# Patient Record
Sex: MALE | Race: WHITE | Employment: OTHER | URBAN - METROPOLITAN AREA
[De-identification: names, ages, dates, MRNs, and addresses within clinical notes are randomized per-mention and may not be internally consistent; named-entity substitution may affect disease eponyms.]

---

## 2017-03-06 PROBLEM — L98.9 SKIN LESION: Status: ACTIVE | Noted: 2017-03-06

## 2017-03-06 PROBLEM — E78.5 HLD (HYPERLIPIDEMIA): Status: ACTIVE | Noted: 2017-03-06

## 2017-09-13 PROBLEM — B35.1 ONYCHOMYCOSIS: Status: ACTIVE | Noted: 2017-09-13

## 2018-09-20 PROBLEM — I49.49 PREMATURE BEATS: Status: ACTIVE | Noted: 2018-09-20

## 2018-09-20 PROBLEM — R06.00 DYSPNEA: Status: ACTIVE | Noted: 2018-09-20

## 2018-10-09 PROBLEM — I20.8 EXERTIONAL ANGINA (HCC): Status: ACTIVE | Noted: 2018-10-09

## 2018-12-26 ENCOUNTER — HOSPITAL ENCOUNTER (OUTPATIENT)
Dept: CT IMAGING | Age: 68
Discharge: HOME | End: 2018-12-26
Attending: INTERNAL MEDICINE
Payer: MEDICARE

## 2018-12-26 VITALS — HEIGHT: 72 IN | BODY MASS INDEX: 29.8 KG/M2 | WEIGHT: 220 LBS

## 2018-12-26 DIAGNOSIS — I20.8 EXERTIONAL ANGINA (HCC): ICD-10-CM

## 2018-12-26 LAB — CREAT BLD-MCNC: 1.1 MG/DL (ref 0.8–1.5)

## 2018-12-26 PROCEDURE — 74011000258 HC RX REV CODE- 258: Performed by: INTERNAL MEDICINE

## 2018-12-26 PROCEDURE — 75574 CT ANGIO HRT W/3D IMAGE: CPT

## 2018-12-26 PROCEDURE — 82565 ASSAY OF CREATININE: CPT

## 2018-12-26 PROCEDURE — 74011636320 HC RX REV CODE- 636/320: Performed by: INTERNAL MEDICINE

## 2018-12-26 RX ORDER — SODIUM CHLORIDE 0.9 % (FLUSH) 0.9 %
10 SYRINGE (ML) INJECTION
Status: COMPLETED | OUTPATIENT
Start: 2018-12-26 | End: 2018-12-26

## 2018-12-26 RX ADMIN — IOPAMIDOL 120 ML: 755 INJECTION, SOLUTION INTRAVENOUS at 07:25

## 2018-12-26 RX ADMIN — SODIUM CHLORIDE 100 ML: 900 INJECTION, SOLUTION INTRAVENOUS at 07:25

## 2018-12-26 RX ADMIN — Medication 10 ML: at 07:25

## 2018-12-27 PROBLEM — R91.1 LUNG NODULE: Status: ACTIVE | Noted: 2018-12-27

## 2019-01-03 PROBLEM — R07.89 CHEST DISCOMFORT: Status: ACTIVE | Noted: 2019-01-03

## 2019-01-15 PROBLEM — I20.8 EXERTIONAL ANGINA (HCC): Status: RESOLVED | Noted: 2018-10-09 | Resolved: 2019-01-15

## 2019-04-19 ENCOUNTER — HOSPITAL ENCOUNTER (OUTPATIENT)
Dept: CT IMAGING | Age: 69
Discharge: HOME | End: 2019-04-19
Attending: INTERNAL MEDICINE
Payer: MEDICARE

## 2019-04-19 DIAGNOSIS — R91.1 LUNG NODULE: ICD-10-CM

## 2019-04-19 PROCEDURE — 71250 CT THORAX DX C-: CPT

## 2019-04-21 NOTE — PROGRESS NOTES
Notify patient nodule is stable, repeat CT is recommended in 6-12 mos, Dr Sun Day previously spoke with me and will pursue ongoing follow up of this issue. Note is forwarded to him as information as well.

## 2019-09-23 PROBLEM — E66.3 OVERWEIGHT: Status: ACTIVE | Noted: 2019-09-23

## 2020-02-04 ENCOUNTER — HOSPITAL ENCOUNTER (OUTPATIENT)
Dept: LAB | Age: 70
Discharge: HOME | End: 2020-02-04

## 2020-02-04 PROCEDURE — 88305 TISSUE EXAM BY PATHOLOGIST: CPT

## 2020-07-29 NOTE — PROGRESS NOTES
Please let him know that we have been reminded that he is a little overdue for repeat CT of the chest to follow the lung nodule that we have been watching. If he is willing please schedule CT of the chest without contrast for pulmonary nodule follow-up.

## 2020-08-08 ENCOUNTER — HOSPITAL ENCOUNTER (OUTPATIENT)
Dept: CT IMAGING | Age: 70
Discharge: HOME | End: 2020-08-08
Attending: INTERNAL MEDICINE
Payer: MEDICARE

## 2020-08-08 DIAGNOSIS — R91.1 PULMONARY NODULE: ICD-10-CM

## 2020-08-08 PROCEDURE — 71250 CT THORAX DX C-: CPT

## 2020-08-10 NOTE — PROGRESS NOTES
The lung nodule remains stable. The recommendation is to repeat this again in 1 year. If it remains stable at that point no further follow-up will be needed.

## 2022-03-18 PROBLEM — E78.5 HLD (HYPERLIPIDEMIA): Status: ACTIVE | Noted: 2017-03-06

## 2022-03-18 PROBLEM — R06.00 DYSPNEA: Status: ACTIVE | Noted: 2018-09-20

## 2022-03-19 PROBLEM — R91.1 LUNG NODULE: Status: ACTIVE | Noted: 2018-12-27

## 2022-03-19 PROBLEM — I49.49 PREMATURE BEATS: Status: ACTIVE | Noted: 2018-09-20

## 2022-03-19 PROBLEM — E66.3 OVERWEIGHT: Status: ACTIVE | Noted: 2019-09-23

## 2022-03-19 PROBLEM — R07.89 CHEST DISCOMFORT: Status: ACTIVE | Noted: 2019-01-03

## 2022-03-19 PROBLEM — L98.9 SKIN LESION: Status: ACTIVE | Noted: 2017-03-06

## 2022-03-20 PROBLEM — B35.1 ONYCHOMYCOSIS: Status: ACTIVE | Noted: 2017-09-13

## 2022-07-25 RX ORDER — ROSUVASTATIN CALCIUM 10 MG/1
TABLET, COATED ORAL
Qty: 90 TABLET | Refills: 3 | Status: SHIPPED | OUTPATIENT
Start: 2022-07-25

## 2022-08-23 LAB — PROSTATE SPECIFIC ANTIGEN: 3.9 NG/ML

## 2022-10-26 ENCOUNTER — OFFICE VISIT (OUTPATIENT)
Dept: INTERNAL MEDICINE CLINIC | Facility: CLINIC | Age: 72
End: 2022-10-26
Payer: MEDICARE

## 2022-10-26 VITALS
BODY MASS INDEX: 31.56 KG/M2 | HEIGHT: 72 IN | WEIGHT: 233 LBS | RESPIRATION RATE: 18 BRPM | HEART RATE: 68 BPM | SYSTOLIC BLOOD PRESSURE: 143 MMHG | DIASTOLIC BLOOD PRESSURE: 72 MMHG | TEMPERATURE: 97.3 F

## 2022-10-26 DIAGNOSIS — R53.82 CHRONIC FATIGUE, UNSPECIFIED: ICD-10-CM

## 2022-10-26 DIAGNOSIS — B35.1 ONYCHOMYCOSIS: ICD-10-CM

## 2022-10-26 DIAGNOSIS — Z00.00 MEDICARE ANNUAL WELLNESS VISIT, SUBSEQUENT: ICD-10-CM

## 2022-10-26 DIAGNOSIS — N40.1 BENIGN PROSTATIC HYPERPLASIA WITH LOWER URINARY TRACT SYMPTOMS, SYMPTOM DETAILS UNSPECIFIED: Primary | ICD-10-CM

## 2022-10-26 DIAGNOSIS — K58.9 IRRITABLE BOWEL SYNDROME WITHOUT DIARRHEA: ICD-10-CM

## 2022-10-26 DIAGNOSIS — R91.1 SOLITARY PULMONARY NODULE: ICD-10-CM

## 2022-10-26 DIAGNOSIS — E78.49 OTHER HYPERLIPIDEMIA: ICD-10-CM

## 2022-10-26 PROCEDURE — G8417 CALC BMI ABV UP PARAM F/U: HCPCS | Performed by: INTERNAL MEDICINE

## 2022-10-26 PROCEDURE — 1036F TOBACCO NON-USER: CPT | Performed by: INTERNAL MEDICINE

## 2022-10-26 PROCEDURE — G8427 DOCREV CUR MEDS BY ELIG CLIN: HCPCS | Performed by: INTERNAL MEDICINE

## 2022-10-26 PROCEDURE — 99213 OFFICE O/P EST LOW 20 MIN: CPT | Performed by: INTERNAL MEDICINE

## 2022-10-26 PROCEDURE — 3017F COLORECTAL CA SCREEN DOC REV: CPT | Performed by: INTERNAL MEDICINE

## 2022-10-26 PROCEDURE — G8484 FLU IMMUNIZE NO ADMIN: HCPCS | Performed by: INTERNAL MEDICINE

## 2022-10-26 PROCEDURE — G0439 PPPS, SUBSEQ VISIT: HCPCS | Performed by: INTERNAL MEDICINE

## 2022-10-26 PROCEDURE — 1123F ACP DISCUSS/DSCN MKR DOCD: CPT | Performed by: INTERNAL MEDICINE

## 2022-10-26 RX ORDER — SOLIFENACIN SUCCINATE 5 MG/1
5 TABLET, FILM COATED ORAL DAILY
COMMUNITY
End: 2022-10-26 | Stop reason: SDUPTHER

## 2022-10-26 RX ORDER — SOLIFENACIN SUCCINATE 5 MG/1
5 TABLET, FILM COATED ORAL DAILY
Qty: 90 TABLET | Refills: 3 | Status: SHIPPED | OUTPATIENT
Start: 2022-10-26

## 2022-10-26 SDOH — ECONOMIC STABILITY: FOOD INSECURITY: WITHIN THE PAST 12 MONTHS, YOU WORRIED THAT YOUR FOOD WOULD RUN OUT BEFORE YOU GOT MONEY TO BUY MORE.: NEVER TRUE

## 2022-10-26 SDOH — ECONOMIC STABILITY: FOOD INSECURITY: WITHIN THE PAST 12 MONTHS, THE FOOD YOU BOUGHT JUST DIDN'T LAST AND YOU DIDN'T HAVE MONEY TO GET MORE.: NEVER TRUE

## 2022-10-26 ASSESSMENT — LIFESTYLE VARIABLES
HOW MANY STANDARD DRINKS CONTAINING ALCOHOL DO YOU HAVE ON A TYPICAL DAY: 1 OR 2
HOW OFTEN DO YOU HAVE A DRINK CONTAINING ALCOHOL: MONTHLY OR LESS

## 2022-10-26 ASSESSMENT — PATIENT HEALTH QUESTIONNAIRE - PHQ9
1. LITTLE INTEREST OR PLEASURE IN DOING THINGS: 0
SUM OF ALL RESPONSES TO PHQ9 QUESTIONS 1 & 2: 0
2. FEELING DOWN, DEPRESSED OR HOPELESS: 0
SUM OF ALL RESPONSES TO PHQ QUESTIONS 1-9: 0

## 2022-10-26 ASSESSMENT — SOCIAL DETERMINANTS OF HEALTH (SDOH): HOW HARD IS IT FOR YOU TO PAY FOR THE VERY BASICS LIKE FOOD, HOUSING, MEDICAL CARE, AND HEATING?: NOT HARD AT ALL

## 2022-10-26 NOTE — PATIENT INSTRUCTIONS
Personalized Preventive Plan for Carmencita Urena. - 10/26/2022  Medicare offers a range of preventive health benefits. Some of the tests and screenings are paid in full while other may be subject to a deductible, co-insurance, and/or copay. Some of these benefits include a comprehensive review of your medical history including lifestyle, illnesses that may run in your family, and various assessments and screenings as appropriate. After reviewing your medical record and screening and assessments performed today your provider may have ordered immunizations, labs, imaging, and/or referrals for you. A list of these orders (if applicable) as well as your Preventive Care list are included within your After Visit Summary for your review. Other Preventive Recommendations:    A preventive eye exam performed by an eye specialist is recommended every 1-2 years to screen for glaucoma; cataracts, macular degeneration, and other eye disorders. A preventive dental visit is recommended every 6 months. Try to get at least 150 minutes of exercise per week or 10,000 steps per day on a pedometer . Order or download the FREE \"Exercise & Physical Activity: Your Everyday Guide\" from [employer]. Call 4-249.560.3858 or search [employer] online. You need 9280-4627 mg of calcium and 3038-3435 IU of vitamin D per day. It is possible to meet your calcium requirement with diet alone, but a vitamin D supplement is usually necessary to meet this goal.  When exposed to the sun, use a sunscreen that protects against both UVA and UVB radiation with an SPF of 30 or greater. Reapply every 2 to 3 hours or after sweating, drying off with a towel, or swimming. Always wear a seat belt when traveling in a car. Always wear a helmet when riding a bicycle or motorcycle.
Detail Level: Detailed

## 2022-10-26 NOTE — PROGRESS NOTES
Packs/day: 0.50     Types: Cigarettes     Quit date: 1973     Years since quittin.8    Smokeless tobacco: Former   Substance and Sexual Activity    Alcohol use: Yes    Drug use: No     Social Determinants of Health     Financial Resource Strain: Low Risk     Difficulty of Paying Living Expenses: Not hard at all   Food Insecurity: No Food Insecurity    Worried About Running Out of Food in the Last Year: Never true    Ran Out of Food in the Last Year: Never true   Physical Activity: Sufficiently Active    Days of Exercise per Week: 7 days    Minutes of Exercise per Session: 40 min     Past Surgical History:   Procedure Laterality Date    ORTHOPEDIC SURGERY      LT ANKLE SURGERY    OTHER SURGICAL HISTORY Left 10/1996    ankle     OTHER SURGICAL HISTORY  14    biopsy-bladder/prostate    VASECTOMY  [id number]     Current Outpatient Medications   Medication Sig Dispense Refill    solifenacin (VESICARE) 5 MG tablet Take 1 tablet by mouth daily 90 tablet 3    rosuvastatin (CRESTOR) 10 MG tablet TAKE 1 TABLET BY MOUTH EVERY DAY AT NIGHT 90 tablet 3    alfuzosin (UROXATRAL) 10 MG extended release tablet TAKE 1 TABLET BY MOUTH EVERY DAY      dicyclomine (BENTYL) 10 MG capsule Take 10 mg by mouth 3 times daily      finasteride (PROSCAR) 5 MG tablet Take 5 mg by mouth daily      nitroGLYCERIN (NITROSTAT) 0.4 MG SL tablet Place 0.4 mg under the tongue every 5 minutes as needed       No current facility-administered medications for this visit.      Health Maintenance   Topic Date Due    Hepatitis C screen  Never done    AAA screen  Never done    COVID-19 Vaccine (4 - Booster for Pfizer series) 2021    Depression Screen  2022    Flu vaccine (1) 2022    Lipids  10/22/2022    Annual Wellness Visit (AWV)  10/27/2023    Colorectal Cancer Screen  2025    DTaP/Tdap/Td vaccine (5 - Td or Tdap) 10/21/2030    Shingles vaccine  Completed    Pneumococcal 65+ years Vaccine  Completed    Hepatitis A vaccine  Aged Out    Hib vaccine  Aged Out    Meningococcal (ACWY) vaccine  Aged Out     Family History   Problem Relation Age of Onset    Diabetes Father     Stroke Father     Gall Bladder Disease Father     Cancer Mother         in lymph nodes/ breast             Review of Systems  Review of Systems    BP (!) 143/72 (Site: Left Upper Arm, Position: Sitting, Cuff Size: Large Adult)   Pulse 68   Temp 97.3 °F (36.3 °C) (Temporal)   Resp 18   Ht 6' (1.829 m)   Wt 233 lb (105.7 kg)   BMI 31.60 kg/m²       Physical Exam    Physical Exam  Constitutional:       General: He is not in acute distress. Appearance: Normal appearance. He is not ill-appearing. HENT:      Head: Normocephalic and atraumatic. Right Ear: Tympanic membrane and ear canal normal.      Left Ear: Tympanic membrane and ear canal normal.      Nose: Nose normal.      Mouth/Throat:      Mouth: Mucous membranes are dry. Pharynx: Oropharynx is clear. Eyes:      Extraocular Movements: Extraocular movements intact. Conjunctiva/sclera: Conjunctivae normal.      Pupils: Pupils are equal, round, and reactive to light. Cardiovascular:      Rate and Rhythm: Normal rate and regular rhythm. Pulses: Normal pulses. Heart sounds: Normal heart sounds. Pulmonary:      Effort: Pulmonary effort is normal.      Breath sounds: Normal breath sounds. Abdominal:      General: Abdomen is flat. Bowel sounds are normal. There is no distension. Palpations: Abdomen is soft. There is no mass. Tenderness: There is no abdominal tenderness. There is no rebound. Hernia: No hernia is present. Musculoskeletal:         General: Normal range of motion. Feet:       Comments: Extremely thickened brown great toenail   Skin:     General: Skin is warm. Neurological:      General: No focal deficit present. Mental Status: He is alert and oriented to person, place, and time. Motor: No weakness.    Psychiatric:         Mood and Affect: Mood normal.         Behavior: Behavior normal.         Thought Content: Thought content normal.         Judgment: Judgment normal.       Assessment & Plan    Encounter Diagnoses   Name Primary? Benign prostatic hyperplasia with lower urinary tract symptoms, symptom details unspecified Yes    Other hyperlipidemia     Chronic fatigue, unspecified     Solitary pulmonary nodule     Medicare annual wellness visit, subsequent     Irritable bowel syndrome without diarrhea     Onychomycosis     Comment: left great toenail        Current Outpatient Medications   Medication Sig Dispense Refill    solifenacin (VESICARE) 5 MG tablet Take 1 tablet by mouth daily 90 tablet 3    rosuvastatin (CRESTOR) 10 MG tablet TAKE 1 TABLET BY MOUTH EVERY DAY AT NIGHT 90 tablet 3    alfuzosin (UROXATRAL) 10 MG extended release tablet TAKE 1 TABLET BY MOUTH EVERY DAY      dicyclomine (BENTYL) 10 MG capsule Take 10 mg by mouth 3 times daily      finasteride (PROSCAR) 5 MG tablet Take 5 mg by mouth daily      nitroGLYCERIN (NITROSTAT) 0.4 MG SL tablet Place 0.4 mg under the tongue every 5 minutes as needed       No current facility-administered medications for this visit.        Orders Placed This Encounter   Procedures    Comprehensive Metabolic Panel     Standing Status:   Future     Standing Expiration Date:   10/26/2023    CBC with Auto Differential     Standing Status:   Future     Standing Expiration Date:   10/26/2023    Lipid Panel     Standing Status:   Future     Standing Expiration Date:   10/26/2023    Ambulatory referral to Urology     Referral Priority:   Routine     Referral Type:   Eval and Treat     Referral Reason:   Specialty Services Required     Requested Specialty:   Urology     Number of Visits Requested:   1    Amb External Referral To Podiatry     Referral Priority:   Routine     Referral Type:   Consult for Advice and Opinion     Referral Reason:   Specialty Services Required     Requested Specialty: Podiatry     Number of Visits Requested:   1       Medications Discontinued During This Encounter   Medication Reason    solifenacin (VESICARE) 5 MG tablet REORDER       1. Benign prostatic hyperplasia with lower urinary tract symptoms, symptom details unspecified  He is on numerous medications and feels he needs another opinion regarding his therapy  - Ambulatory referral to Urology    2. Other hyperlipidemia     - Lipid Panel; Future    3. Chronic fatigue, unspecified     - Comprehensive Metabolic Panel; Future  - CBC with Auto Differential; Future    4. Solitary pulmonary nodule       5. Medicare annual wellness visit, subsequent       6. Irritable bowel syndrome without diarrhea  Stable  7. Onychomycosis  Probable need to have the left great toenail excised  - Amb External Referral To Podiatry      No follow-up provider specified. Jennifer Nicole MD  Medicare Annual Wellness Visit    Nancy CastanedaSlava is here for Medicare AWV, Annual Exam (Pt presents to the office today for their annual exam./), and Nail Problem (Need a referral to a podiatrist; where he stumped his left big the new nail is growing under the old nail. He thought  it would fall off but it didnt)    Assessment & Plan   Benign prostatic hyperplasia with lower urinary tract symptoms, symptom details unspecified  Other hyperlipidemia  Chronic fatigue, unspecified  Solitary pulmonary nodule  Medicare annual wellness visit, subsequent    Recommendations for Preventive Services Due: see orders and patient instructions/AVS.  Recommended screening schedule for the next 5-10 years is provided to the patient in written form: see Patient Instructions/AVS.     No follow-ups on file. Subjective   The following acute and/or chronic problems were also addressed today:   Diagnosis Orders   1. Benign prostatic hyperplasia with lower urinary tract symptoms, symptom details unspecified  Ambulatory referral to Urology      2.  Other hyperlipidemia Lipid Panel    Lipid Panel      3. Chronic fatigue, unspecified  Comprehensive Metabolic Panel    CBC with Auto Differential    CBC with Auto Differential    Comprehensive Metabolic Panel      4. Solitary pulmonary nodule        5. Medicare annual wellness visit, subsequent        6. Irritable bowel syndrome without diarrhea        7. Onychomycosis  Amb External Referral To Podiatry    left great toenail            Patient's complete Health Risk Assessment and screening values have been reviewed and are found in Flowsheets. The following problems were reviewed today and where indicated follow up appointments were made and/or referrals ordered. Positive Risk Factor Screenings with Interventions:             General Health and ACP:  General  In general, how would you say your health is?: Very Good  In the past 7 days, have you experienced any of the following: New or Increased Pain, New or Increased Fatigue, Loneliness, Social Isolation, Stress or Anger?: No  Do you get the social and emotional support that you need?: Yes  Do you have a Living Will?: Yes    Advance Directives       Power of  Living Will ACP-Advance Directive ACP-Power of     Not on File Not on File Not on File Not on File          General Health Risk Interventions:  none    Health Habits/Nutrition:  Physical Activity: Sufficiently Active    Days of Exercise per Week: 7 days    Minutes of Exercise per Session: 40 min     Have you lost any weight without trying in the past 3 months?: No  Body mass index: (!) 31.6  Have you seen the dentist within the past year?: Yes  Health Habits/Nutrition Interventions:  none             Objective   Vitals:    10/26/22 1006   BP: (!) 143/72   Site: Left Upper Arm   Position: Sitting   Cuff Size: Large Adult   Pulse: 68   Resp: 18   Temp: 97.3 °F (36.3 °C)   TempSrc: Temporal   Weight: 233 lb (105.7 kg)   Height: 6' (1.829 m)      Body mass index is 31.6 kg/m².       See note       No Known Allergies  Prior to Visit Medications    Medication Sig Taking?  Authorizing Provider   solifenacin (VESICARE) 5 MG tablet Take 5 mg by mouth daily Yes Historical Provider, MD   rosuvastatin (CRESTOR) 10 MG tablet TAKE 1 TABLET BY MOUTH EVERY DAY AT NIGHT Yes Norma Ramirez MD   alfuzosin (UROXATRAL) 10 MG extended release tablet TAKE 1 TABLET BY MOUTH EVERY DAY Yes Ar Automatic Reconciliation   dicyclomine (BENTYL) 10 MG capsule Take 10 mg by mouth 3 times daily Yes Ar Automatic Reconciliation   finasteride (PROSCAR) 5 MG tablet Take 5 mg by mouth daily Yes Ar Automatic Reconciliation   nitroGLYCERIN (NITROSTAT) 0.4 MG SL tablet Place 0.4 mg under the tongue every 5 minutes as needed Yes Ar Automatic Reconciliation       CareTeam (Including outside providers/suppliers regularly involved in providing care):   Patient Care Team:  Norma Ramirez MD as PCP - Fani Montejo MD as PCP - [facility] Empaneled Provider     Reviewed and updated this visit:  Tobacco  Allergies  Med Hx  Surg Hx  Soc Hx  Fam Hx

## 2022-10-27 LAB
ALBUMIN SERPL-MCNC: 3.8 G/DL (ref 3.2–4.6)
ALBUMIN/GLOB SERPL: 1.4 {RATIO} (ref 0.4–1.6)
ALP SERPL-CCNC: 59 U/L (ref 50–136)
ALT SERPL-CCNC: 44 U/L (ref 12–65)
ANION GAP SERPL CALC-SCNC: 4 MMOL/L (ref 2–11)
AST SERPL-CCNC: 14 U/L (ref 15–37)
BASOPHILS # BLD: 0.1 K/UL (ref 0–0.2)
BASOPHILS NFR BLD: 1 % (ref 0–2)
BILIRUB SERPL-MCNC: 0.6 MG/DL (ref 0.2–1.1)
BUN SERPL-MCNC: 24 MG/DL (ref 8–23)
CALCIUM SERPL-MCNC: 10 MG/DL (ref 8.3–10.4)
CHLORIDE SERPL-SCNC: 114 MMOL/L (ref 101–110)
CHOLEST SERPL-MCNC: 116 MG/DL
CO2 SERPL-SCNC: 25 MMOL/L (ref 21–32)
CREAT SERPL-MCNC: 1.2 MG/DL (ref 0.8–1.5)
DIFFERENTIAL METHOD BLD: ABNORMAL
EOSINOPHIL # BLD: 0.2 K/UL (ref 0–0.8)
EOSINOPHIL NFR BLD: 2 % (ref 0.5–7.8)
ERYTHROCYTE [DISTWIDTH] IN BLOOD BY AUTOMATED COUNT: 13 % (ref 11.9–14.6)
GLOBULIN SER CALC-MCNC: 2.8 G/DL (ref 2.8–4.5)
GLUCOSE SERPL-MCNC: 102 MG/DL (ref 65–100)
HCT VFR BLD AUTO: 46.2 % (ref 41.1–50.3)
HDLC SERPL-MCNC: 31 MG/DL (ref 40–60)
HDLC SERPL: 3.7 {RATIO}
HGB BLD-MCNC: 15.1 G/DL (ref 13.6–17.2)
IMM GRANULOCYTES # BLD AUTO: 0 K/UL (ref 0–0.5)
IMM GRANULOCYTES NFR BLD AUTO: 1 % (ref 0–5)
LDLC SERPL CALC-MCNC: 32.4 MG/DL
LYMPHOCYTES # BLD: 1.8 K/UL (ref 0.5–4.6)
LYMPHOCYTES NFR BLD: 21 % (ref 13–44)
MCH RBC QN AUTO: 28.2 PG (ref 26.1–32.9)
MCHC RBC AUTO-ENTMCNC: 32.7 G/DL (ref 31.4–35)
MCV RBC AUTO: 86.4 FL (ref 82–102)
MONOCYTES # BLD: 0.9 K/UL (ref 0.1–1.3)
MONOCYTES NFR BLD: 10 % (ref 4–12)
NEUTS SEG # BLD: 5.5 K/UL (ref 1.7–8.2)
NEUTS SEG NFR BLD: 65 % (ref 43–78)
NRBC # BLD: 0 K/UL (ref 0–0.2)
PLATELET # BLD AUTO: 122 K/UL (ref 150–450)
PMV BLD AUTO: 10.6 FL (ref 9.4–12.3)
POTASSIUM SERPL-SCNC: 3.7 MMOL/L (ref 3.5–5.1)
PROT SERPL-MCNC: 6.6 G/DL (ref 6.3–8.2)
RBC # BLD AUTO: 5.35 M/UL (ref 4.23–5.6)
SODIUM SERPL-SCNC: 143 MMOL/L (ref 133–143)
TRIGL SERPL-MCNC: 263 MG/DL (ref 35–150)
VLDLC SERPL CALC-MCNC: 52.6 MG/DL (ref 6–23)
WBC # BLD AUTO: 8.4 K/UL (ref 4.3–11.1)

## 2022-10-29 DIAGNOSIS — D69.6 THROMBOCYTOPENIA (HCC): Primary | ICD-10-CM

## 2023-01-03 ENCOUNTER — NURSE ONLY (OUTPATIENT)
Dept: INTERNAL MEDICINE CLINIC | Facility: CLINIC | Age: 73
End: 2023-01-03

## 2023-01-03 DIAGNOSIS — D69.6 THROMBOCYTOPENIA (HCC): ICD-10-CM

## 2023-01-03 LAB
BASOPHILS # BLD: 0.1 K/UL (ref 0–0.2)
BASOPHILS NFR BLD: 1 % (ref 0–2)
DIFFERENTIAL METHOD BLD: ABNORMAL
EOSINOPHIL # BLD: 0.2 K/UL (ref 0–0.8)
EOSINOPHIL NFR BLD: 2 % (ref 0.5–7.8)
ERYTHROCYTE [DISTWIDTH] IN BLOOD BY AUTOMATED COUNT: 13.2 % (ref 11.9–14.6)
HCT VFR BLD AUTO: 46.5 % (ref 41.1–50.3)
HGB BLD-MCNC: 15.2 G/DL (ref 13.6–17.2)
IMM GRANULOCYTES # BLD AUTO: 0.1 K/UL (ref 0–0.5)
IMM GRANULOCYTES NFR BLD AUTO: 1 % (ref 0–5)
LYMPHOCYTES # BLD: 2.1 K/UL (ref 0.5–4.6)
LYMPHOCYTES NFR BLD: 25 % (ref 13–44)
MCH RBC QN AUTO: 28.1 PG (ref 26.1–32.9)
MCHC RBC AUTO-ENTMCNC: 32.7 G/DL (ref 31.4–35)
MCV RBC AUTO: 86 FL (ref 82–102)
MONOCYTES # BLD: 0.8 K/UL (ref 0.1–1.3)
MONOCYTES NFR BLD: 10 % (ref 4–12)
NEUTS SEG # BLD: 5.2 K/UL (ref 1.7–8.2)
NEUTS SEG NFR BLD: 61 % (ref 43–78)
NRBC # BLD: 0 K/UL (ref 0–0.2)
PLATELET # BLD AUTO: 114 K/UL (ref 150–450)
PMV BLD AUTO: 10.6 FL (ref 9.4–12.3)
RBC # BLD AUTO: 5.41 M/UL (ref 4.23–5.6)
WBC # BLD AUTO: 8.4 K/UL (ref 4.3–11.1)

## 2023-01-05 ENCOUNTER — TELEPHONE (OUTPATIENT)
Dept: INTERNAL MEDICINE CLINIC | Facility: CLINIC | Age: 73
End: 2023-01-05

## 2023-01-05 DIAGNOSIS — D69.6 THROMBOCYTOPENIA (HCC): Primary | ICD-10-CM

## 2023-01-05 NOTE — TELEPHONE ENCOUNTER
----- Message from Oscar Martinez MD sent at 1/5/2023  1:03 PM EST -----  Please notify patient that their lab results shows the platelet count has continued to decrease and is now 114. I would like to refer him to hematology for evaluation of thrombocytopenia.   If he is agreeable please refer cms

## 2023-02-06 NOTE — PROGRESS NOTES
[facility]: Office Visit New Patient H & P    Reason for visit:  Thrombocytopenia    Overview:  Mr. Joseph Cano is a 57-year-old  male with a medical history of elevated PSA, hyperlipidemia, kidney stones, and hemorrhoids. Surgical history includes vasectomy, left ankle surgery, and prostate biopsy (2014). Family history of cancer above, diabetes, and stroke. He is a former smoker but quit in 1973 and denies drug use. Consumes alcohol socially. On 10/26 he was seen in routine follow up with PCP. Platelets were 232. Denies any bleeding or issues. Repeat CBC on 1/3/23 showed persistent low platelet count at 531. Referral to hematology for evaluation and recommendations. History of Present Illness:  Mr. Abhi Mcclendon was seen in the office initially on 2/7/2023 for evaluation of thrombocytopenia noticed on recent labs ordered by his PCP. In last few months, he has noticed a tendency to bruise easily with subjectively minor trauma. He denies hematemesis, bloody/black stools, nosebleeds/gum bleeds, unexplained fever, drenching night sweats, swollen glands in the body, new/unusual sites of bone pain, chest pain or palpitations. He was evaluated by cardiology about 2 years ago for anginal symptoms and reportedly work-up for coronary artery disease was negative. He has had episodes of hematuria in the past which were deemed related to BPH. He follows with urology at [facility], last visit was in October 2022. Review of Systems:  14 point ROS was negative except as mentioned above. ECOG PERFORMANCE STATUS - 0-Fully active, able to carry on all pre-disease performance without restriction. Pain - /10. None/Minimal pain - not affecting QOL     Fatigue - No flowsheet data found. Distress - No flowsheet data found.          Reviewed and updated this visit by provider:          Current Outpatient Medications   Medication Sig Dispense Refill    solifenacin (VESICARE) 5 MG tablet Take 1 tablet by mouth daily 90 tablet 3    rosuvastatin (CRESTOR) 10 MG tablet TAKE 1 TABLET BY MOUTH EVERY DAY AT NIGHT 90 tablet 3    alfuzosin (UROXATRAL) 10 MG extended release tablet TAKE 1 TABLET BY MOUTH EVERY DAY      dicyclomine (BENTYL) 10 MG capsule Take 10 mg by mouth 3 times daily      finasteride (PROSCAR) 5 MG tablet Take 5 mg by mouth daily      nitroGLYCERIN (NITROSTAT) 0.4 MG SL tablet Place 0.4 mg under the tongue every 5 minutes as needed       No current facility-administered medications for this visit. OBJECTIVE:  There were no vitals taken for this visit. Physical Exam:  Patient alert and oriented x 3, in no acute distress  Integumentary: No Pallor, no icterus  HEENT: Moist mucous membranes, normal oropharynx  Lymph nodes: No cervical or axillary lymphadenopathy is palpable  Cardiovascular:RRR, S1, S2 present, no murmur is audible. Extrasystoles are noted.   Lungs: Clear to auscultation, no rales or wheezing, no accessory muscle use  Abdomen: Soft, and non-tender on palpation, no organomegaly, bowel sounds audible  Extremities: No peripheral edema, no joint swelling  Neurological: patient can follow commands and move all extremities    Labs:  Lab Results   Component Value Date    WBC 8.4 01/03/2023    HGB 15.2 01/03/2023    HCT 46.5 01/03/2023    MCV 86.0 01/03/2023     (L) 01/03/2023       Lab Results   Component Value Date    NEUTROABS 5.3 10/22/2021    LYMPHOPCT 25 01/03/2023    LYMPHSABS 2.1 01/03/2023    MONOPCT 10 01/03/2023    MONOSABS 0.8 01/03/2023    EOSABS 0.2 01/03/2023    BASOPCT 1 01/03/2023       Lab Results   Component Value Date     10/26/2022    K 3.7 10/26/2022     (H) 10/26/2022    CO2 25 10/26/2022    BUN 24 (H) 10/26/2022    CREATININE 1.20 10/26/2022    GLUCOSE 102 (H) 10/26/2022    CALCIUM 10.0 10/26/2022    PROT 6.6 10/26/2022    LABALBU 3.8 10/26/2022    BILITOT 0.6 10/26/2022    ALKPHOS 59 10/26/2022    AST 14 (L) 10/26/2022    ALT 44 10/26/2022    LABGLOM >60 10/26/2022    GFRAA 83 10/22/2021    AGRATIO 2.0 10/22/2021    GLOB 2.8 10/26/2022     1/3/23 [facility] PCP CBC with trend         Imaging:  No results found. Problems: This 67years old gentleman presents for evaluation of mild thrombocytopenia noticed on recent labs. With the exception of some bruisability, is fairly asymptomatic. Cardiac exam today reveals presence of extrasystoles. PLAN:  Reviewed the findings of thrombocytopenia. The differential diagnosis is broad, but we will focus for evaluation and ruling out reversible causes. We will send routine labs (citrate tube), smear review by pathologist, PT/PTT/Fibirinogen, LDH, haptoglobin, SPEP, immunoglobulins, vitamin B12, folate, MMA, hepatitis B, C and HIV testing. Abd US asess liver & spleen  Medications reviewed  Discussed the possibility of there being ITP, the diagnosis of which would require excluding virtually all other causes of thrombocytopenia. There is no current indication for bone marrow biopsy and we will likely proceed with observation at this time. No intervention is needed as long as counts remain over 30,000. He will be referred to cardiology  ROV with labs as scheduled. Jose Pham MD  [address] [place] [address] Hematology and Oncology  [address] [place] [address]  Office : [phone]  Fax : [phone]    Elements of this note have been dictated using speech recognition software. As a result, errors of speech recognition may have occurred.

## 2023-02-07 ENCOUNTER — HOSPITAL ENCOUNTER (OUTPATIENT)
Dept: LAB | Age: 73
Discharge: HOME | End: 2023-02-10
Payer: MEDICARE

## 2023-02-07 ENCOUNTER — OFFICE VISIT (OUTPATIENT)
Dept: ONCOLOGY | Age: 73
End: 2023-02-07
Payer: MEDICARE

## 2023-02-07 VITALS
HEIGHT: 72 IN | DIASTOLIC BLOOD PRESSURE: 87 MMHG | RESPIRATION RATE: 16 BRPM | BODY MASS INDEX: 34.31 KG/M2 | SYSTOLIC BLOOD PRESSURE: 149 MMHG | TEMPERATURE: 97.6 F | HEART RATE: 75 BPM | OXYGEN SATURATION: 98 % | WEIGHT: 253.3 LBS

## 2023-02-07 DIAGNOSIS — Z01.89 ENCOUNTER FOR OTHER SPECIFIED SPECIAL EXAMINATIONS: ICD-10-CM

## 2023-02-07 DIAGNOSIS — D69.6 THROMBOCYTOPENIA (HCC): Primary | ICD-10-CM

## 2023-02-07 DIAGNOSIS — D69.6 THROMBOCYTOPENIA (HCC): ICD-10-CM

## 2023-02-07 DIAGNOSIS — I49.9 CARDIAC ARRHYTHMIA, UNSPECIFIED CARDIAC ARRHYTHMIA TYPE: ICD-10-CM

## 2023-02-07 LAB
ALBUMIN SERPL-MCNC: 3.7 G/DL (ref 3.2–4.6)
ALBUMIN/GLOB SERPL: 1.1 (ref 0.4–1.6)
ALP SERPL-CCNC: 59 U/L (ref 50–136)
ALT SERPL-CCNC: 37 U/L (ref 12–65)
ANION GAP SERPL CALC-SCNC: 4 MMOL/L (ref 2–11)
APTT PPP: 26.4 SEC (ref 24.5–34.2)
AST SERPL-CCNC: 14 U/L (ref 15–37)
BASOPHILS # BLD: 0.1 K/UL (ref 0–0.2)
BASOPHILS NFR BLD: 1 % (ref 0–2)
BILIRUB SERPL-MCNC: 0.6 MG/DL (ref 0.2–1.1)
BUN SERPL-MCNC: 20 MG/DL (ref 8–23)
CALCIUM SERPL-MCNC: 9.7 MG/DL (ref 8.3–10.4)
CHLORIDE SERPL-SCNC: 112 MMOL/L (ref 101–110)
CO2 SERPL-SCNC: 25 MMOL/L (ref 21–32)
CREAT SERPL-MCNC: 1.1 MG/DL (ref 0.8–1.5)
DIFFERENTIAL METHOD BLD: ABNORMAL
EOSINOPHIL # BLD: 0.1 K/UL (ref 0–0.8)
EOSINOPHIL NFR BLD: 1 % (ref 0.5–7.8)
ERYTHROCYTE [DISTWIDTH] IN BLOOD BY AUTOMATED COUNT: 13.2 % (ref 11.9–14.6)
FIBRINOGEN PPP-MCNC: 322 MG/DL (ref 190–501)
FOLATE SERPL-MCNC: 13 NG/ML (ref 3.1–17.5)
GLOBULIN SER CALC-MCNC: 3.3 G/DL (ref 2.8–4.5)
GLUCOSE SERPL-MCNC: 97 MG/DL (ref 65–100)
HAPTOGLOB SERPL-MCNC: 108 MG/DL (ref 30–200)
HAV IGM SER QL: NONREACTIVE
HBV CORE IGM SER QL: NONREACTIVE
HBV SURFACE AG SER QL: NONREACTIVE
HCT VFR BLD AUTO: 45.5 % (ref 41.1–50.3)
HCV AB SER QL: NONREACTIVE
HGB BLD-MCNC: 15.1 G/DL (ref 13.6–17.2)
HIV 1+2 AB+HIV1 P24 AG SERPL QL IA: NONREACTIVE
HIV 1/2 RESULT COMMENT: NORMAL
IMM GRANULOCYTES # BLD AUTO: 0.1 K/UL (ref 0–0.5)
IMM GRANULOCYTES NFR BLD AUTO: 1 % (ref 0–5)
INR PPP: 1
LDH SERPL L TO P-CCNC: 152 U/L (ref 110–210)
LYMPHOCYTES # BLD: 1.7 K/UL (ref 0.5–4.6)
LYMPHOCYTES NFR BLD: 21 % (ref 13–44)
MCH RBC QN AUTO: 27.9 PG (ref 26.1–32.9)
MCHC RBC AUTO-ENTMCNC: 33.2 G/DL (ref 31.4–35)
MCV RBC AUTO: 84.1 FL (ref 82–102)
MONOCYTES # BLD: 0.8 K/UL (ref 0.1–1.3)
MONOCYTES NFR BLD: 10 % (ref 4–12)
NEUTS SEG # BLD: 5.4 K/UL (ref 1.7–8.2)
NEUTS SEG NFR BLD: 67 % (ref 43–78)
NRBC # BLD: 0 K/UL (ref 0–0.2)
PLATELET # BLD AUTO: 106 K/UL (ref 150–450)
PMV BLD AUTO: 9.8 FL (ref 9.4–12.3)
POTASSIUM SERPL-SCNC: 4.2 MMOL/L (ref 3.5–5.1)
PROT SERPL-MCNC: 7 G/DL (ref 6.3–8.2)
PROTHROMBIN TIME: 13.1 SEC (ref 12.6–14.3)
RBC # BLD AUTO: 5.41 M/UL (ref 4.23–5.6)
SODIUM SERPL-SCNC: 141 MMOL/L (ref 133–143)
VIT B12 SERPL-MCNC: 282 PG/ML (ref 193–986)
WBC # BLD AUTO: 8.1 K/UL (ref 4.3–11.1)

## 2023-02-07 PROCEDURE — 85384 FIBRINOGEN ACTIVITY: CPT

## 2023-02-07 PROCEDURE — G8427 DOCREV CUR MEDS BY ELIG CLIN: HCPCS | Performed by: INTERNAL MEDICINE

## 2023-02-07 PROCEDURE — 82746 ASSAY OF FOLIC ACID SERUM: CPT

## 2023-02-07 PROCEDURE — 85610 PROTHROMBIN TIME: CPT

## 2023-02-07 PROCEDURE — 80053 COMPREHEN METABOLIC PANEL: CPT

## 2023-02-07 PROCEDURE — 99204 OFFICE O/P NEW MOD 45 MIN: CPT | Performed by: INTERNAL MEDICINE

## 2023-02-07 PROCEDURE — G8484 FLU IMMUNIZE NO ADMIN: HCPCS | Performed by: INTERNAL MEDICINE

## 2023-02-07 PROCEDURE — 36415 COLL VENOUS BLD VENIPUNCTURE: CPT

## 2023-02-07 PROCEDURE — 83921 ORGANIC ACID SINGLE QUANT: CPT

## 2023-02-07 PROCEDURE — G8417 CALC BMI ABV UP PARAM F/U: HCPCS | Performed by: INTERNAL MEDICINE

## 2023-02-07 PROCEDURE — 82607 VITAMIN B-12: CPT

## 2023-02-07 PROCEDURE — 85025 COMPLETE CBC W/AUTO DIFF WBC: CPT

## 2023-02-07 PROCEDURE — 85730 THROMBOPLASTIN TIME PARTIAL: CPT

## 2023-02-07 PROCEDURE — 83010 ASSAY OF HAPTOGLOBIN QUANT: CPT

## 2023-02-07 PROCEDURE — 80074 ACUTE HEPATITIS PANEL: CPT

## 2023-02-07 PROCEDURE — 1123F ACP DISCUSS/DSCN MKR DOCD: CPT | Performed by: INTERNAL MEDICINE

## 2023-02-07 PROCEDURE — 3017F COLORECTAL CA SCREEN DOC REV: CPT | Performed by: INTERNAL MEDICINE

## 2023-02-07 PROCEDURE — 1036F TOBACCO NON-USER: CPT | Performed by: INTERNAL MEDICINE

## 2023-02-07 PROCEDURE — 83615 LACTATE (LD) (LDH) ENZYME: CPT

## 2023-02-07 PROCEDURE — 87389 HIV-1 AG W/HIV-1&-2 AB AG IA: CPT

## 2023-02-07 ASSESSMENT — PATIENT HEALTH QUESTIONNAIRE - PHQ9
2. FEELING DOWN, DEPRESSED OR HOPELESS: 0
SUM OF ALL RESPONSES TO PHQ9 QUESTIONS 1 & 2: 0
SUM OF ALL RESPONSES TO PHQ QUESTIONS 1-9: 0
1. LITTLE INTEREST OR PLEASURE IN DOING THINGS: 0

## 2023-02-07 NOTE — PATIENT INSTRUCTIONS
Patient Instructions from Today's Visit    Reason for Visit:  New Patient - Thrombocytopenia     Diagnosis Information:  [email or web address]/. net/about-us/asco-answers-patient-education-materials/kzef-cvmoqjs-ihhf-sheets    Plan: We are seeing you today for thrombocytopenia (low platelet count). We are going to do some additional lab work today to further investigate this. An ultrasound of the abdomen has been ordered to evaluate the liver and spleen. There is no intervention for low platelet counts unless your counts were to drop below 30,000. We will continue to monitor counts with routine lab work. Depending on results of lab test, we may need to consider bone marrow biopsy to rule out bone marrow disorder - However, this does not appear to be necessary at this time. We have also discussed the possibility of ITP (idiopathic thrombocytopenic purpura) - this is a diagnosis of exclusion. Meaning, this diagnosis is only given when all other possible causes of low platelets have been ruled out. If anything concerning comes back on the lab work, we will call you and let you know. Otherwise, no news is good news and we will discuss everything in full at your next visit with us. Please call us if you have any questions or concerns before your next visit. Follow Up: Follow up in 4-6 weeks with Dr. Quita Davies, with labs prior. Recent Lab Results:  No visits with results within 3 Day(s) from this visit.    Latest known visit with results is:   Nurse Only on 01/03/2023   Component Date Value Ref Range Status    WBC 01/03/2023 8.4  4.3 - 11.1 K/uL Final    RBC 01/03/2023 5.41  4.23 - 5.6 M/uL Final    Hemoglobin 01/03/2023 15.2  13.6 - 17.2 g/dL Final    Hematocrit 01/03/2023 46.5  41.1 - 50.3 % Final    MCV 01/03/2023 86.0  82 - 102 FL Final    MCH 01/03/2023 28.1  26.1 - 32.9 PG Final    MCHC 01/03/2023 32.7  31.4 - 35.0 g/dL Final    RDW 01/03/2023 13.2  11.9 - 14.6 % Final    Platelets 40/19/9325 114 (A)  150 - 450 K/uL Final    MPV 01/03/2023 10.6  9.4 - 12.3 FL Final    nRBC 01/03/2023 0.00  0.0 - 0.2 K/uL Final    **Note: Absolute NRBC parameter is now reported with Hemogram**    Differential Type 01/03/2023 AUTOMATED    Final    Seg Neutrophils 01/03/2023 61  43 - 78 % Final    Lymphocytes 01/03/2023 25  13 - 44 % Final    Monocytes 01/03/2023 10  4.0 - 12.0 % Final    Eosinophils % 01/03/2023 2  0.5 - 7.8 % Final    Basophils 01/03/2023 1  0.0 - 2.0 % Final    Immature Granulocytes 01/03/2023 1  0.0 - 5.0 % Final    Segs Absolute 01/03/2023 5.2  1.7 - 8.2 K/UL Final    Absolute Lymph # 01/03/2023 2.1  0.5 - 4.6 K/UL Final    Absolute Mono # 01/03/2023 0.8  0.1 - 1.3 K/UL Final    Absolute Eos # 01/03/2023 0.2  0.0 - 0.8 K/UL Final    Basophils Absolute 01/03/2023 0.1  0.0 - 0.2 K/UL Final    Absolute Immature Granulocyte 01/03/2023 0.1  0.0 - 0.5 K/UL Final         Treatment Summary has been discussed and given to patient: N/A        -------------------------------------------------------------------------------------------------------------------  Please call our office at [phone] if you have any  of the following symptoms:   Fever of 100.5 or greater  Chills  Shortness of breath  Swelling or pain in one leg    After office hours an answering service is available and will contact a provider for emergencies or if you are experiencing any of the above symptoms. Patient does express an interest in My Chart. My Chart log in information explained on the after visit summary printout at the [facility] desk.     Evie Hernandez RN

## 2023-02-08 LAB — PATH REV BLD -IMP: NORMAL

## 2023-02-09 ENCOUNTER — HOSPITAL ENCOUNTER (OUTPATIENT)
Dept: ULTRASOUND IMAGING | Age: 73
Discharge: HOME | End: 2023-02-09
Payer: MEDICARE

## 2023-02-09 ENCOUNTER — TELEPHONE (OUTPATIENT)
Dept: ONCOLOGY | Age: 73
End: 2023-02-09

## 2023-02-09 DIAGNOSIS — D69.6 THROMBOCYTOPENIA (HCC): ICD-10-CM

## 2023-02-09 DIAGNOSIS — D73.89 SPLENIC LESION: ICD-10-CM

## 2023-02-09 DIAGNOSIS — D69.6 THROMBOCYTOPENIA (HCC): Primary | ICD-10-CM

## 2023-02-09 DIAGNOSIS — K76.0 FATTY LIVER: ICD-10-CM

## 2023-02-09 PROCEDURE — 76700 US EXAM ABDOM COMPLETE: CPT

## 2023-02-09 NOTE — TELEPHONE ENCOUNTER
Attempted to contact. No answer, LVM.   Camero msg sent.     ----- Message from Annie Perez MD sent at 2/9/2023  1:30 PM EST -----  Please order MRI abdomen to evaluate liver and splenic lesion seen on US

## 2023-02-12 LAB — METHYLMALONATE SERPL-SCNC: 146 NMOL/L (ref 0–378)

## 2023-02-22 ENCOUNTER — INITIAL CONSULT (OUTPATIENT)
Dept: CARDIOLOGY CLINIC | Age: 73
End: 2023-02-22
Payer: MEDICARE

## 2023-02-22 VITALS
BODY MASS INDEX: 33.01 KG/M2 | DIASTOLIC BLOOD PRESSURE: 60 MMHG | WEIGHT: 235.8 LBS | SYSTOLIC BLOOD PRESSURE: 132 MMHG | HEIGHT: 71 IN | HEART RATE: 68 BPM

## 2023-02-22 DIAGNOSIS — E78.2 MIXED HYPERLIPIDEMIA: ICD-10-CM

## 2023-02-22 DIAGNOSIS — D69.6 THROMBOCYTOPENIA (HCC): ICD-10-CM

## 2023-02-22 DIAGNOSIS — I49.1 PAC (PREMATURE ATRIAL CONTRACTION): ICD-10-CM

## 2023-02-22 DIAGNOSIS — I49.9 IRREGULAR HEART BEAT: ICD-10-CM

## 2023-02-22 DIAGNOSIS — R07.2 PRECORDIAL PAIN: Primary | ICD-10-CM

## 2023-02-22 PROCEDURE — 99205 OFFICE O/P NEW HI 60 MIN: CPT | Performed by: INTERNAL MEDICINE

## 2023-02-22 PROCEDURE — 1123F ACP DISCUSS/DSCN MKR DOCD: CPT | Performed by: INTERNAL MEDICINE

## 2023-02-22 PROCEDURE — 1036F TOBACCO NON-USER: CPT | Performed by: INTERNAL MEDICINE

## 2023-02-22 PROCEDURE — G8484 FLU IMMUNIZE NO ADMIN: HCPCS | Performed by: INTERNAL MEDICINE

## 2023-02-22 PROCEDURE — 3017F COLORECTAL CA SCREEN DOC REV: CPT | Performed by: INTERNAL MEDICINE

## 2023-02-22 PROCEDURE — G8428 CUR MEDS NOT DOCUMENT: HCPCS | Performed by: INTERNAL MEDICINE

## 2023-02-22 PROCEDURE — G8417 CALC BMI ABV UP PARAM F/U: HCPCS | Performed by: INTERNAL MEDICINE

## 2023-02-22 PROCEDURE — 93000 ELECTROCARDIOGRAM COMPLETE: CPT | Performed by: INTERNAL MEDICINE

## 2023-02-22 ASSESSMENT — ENCOUNTER SYMPTOMS
ABDOMINAL PAIN: 0
WHEEZING: 0
HEMATEMESIS: 0
STRIDOR: 0
HEMATOCHEZIA: 0
HOARSE VOICE: 0
EYE REDNESS: 0
DOUBLE VISION: 0
HEMOPTYSIS: 0

## 2023-02-22 NOTE — PROGRESS NOTES
[facility] CARDIOLOGY  [address] [facility], [address]  [facility], [address] [place] [address]  PHONE: [phone]          23    NAME:  Lorena Stanton. : 1950  MRN: [id number]         SUBJECTIVE:   Lorena Stanton. is a 67 y.o. male seen for a visit regarding the following:     Chief Complaint   Patient presents with    Other     Arrhythmia     Hyperlipidemia           HPI:    Cardio problem list:  1. Irregular heartbeat/PACs  2. Hyperlipidemia  3. Thrombocytopenia  4. Chest pain:  -Had a CT coronary calcium score in 2018 that came back at 0  5. BPH    Dear Dr. Jamil Torres,  I saw Mr. Clive Ibarra is a pleasant 72-year-old gentleman in cardiovascular consultation for an irregular heartbeat with PACs and chest pain with known underlying hyperlipidemia and thrombocytopenia. Irregular heartbeat: He said he is not really aware of this. He has felt his pulse irregular at times but does not have any formal diagnosis of any significant arrhythmia. No significant first-degree relatives with A-fib or atrial flutter. No TIAs or strokelike symptoms himself. EKG with PACs noted. These are fairly frequent and yet 3 in just 10 seconds. Chest pain-he says he remains fairly active and walks the dogs but if he walks uphill, he gets some central substernal chest tightness with no particular radiation, mild to moderate in intensity, has been present over the past year. He said he has been tested for his heart in the past and had a CT coronary calcium score in 2018 that came back at 0. He has not had another stress test in several years. No significant diaphoresis or associated palpitations or presyncope or syncope or TIA strokelike symptoms. Thrombocytopenia-being worked up by hematology currently. He does have easy bruising    Hyperlipidemia-remains on rosuvastatin therapy 10 mg every day and appears to be tolerating it well with no myalgias.    -He does have a family history of valvular heart disease.     Past Medical History, Past Surgical History, Family history, Social History, and Medications were all reviewed with the patient today and updated as necessary.      No Known Allergies  Patient Active Problem List   Diagnosis    Dyspnea    Enlarged prostate with lower urinary tract symptoms (LUTS)    Encounter for long-term (current) drug use    HLD (hyperlipidemia)    Lung nodule    Overweight    Skin lesion    Other seborrheic keratosis    Premature beats    Calculus of kidney    Chest discomfort    Shoulder pain    Chest pain    Elevated prostate specific antigen (PSA)    Onychomycosis    Irregular heart beat     Past Medical History:   Diagnosis Date    Calculus of kidney     Elevated prostate specific antigen (PSA)     Other and unspecified hyperlipidemia     Other convulsions     Other seborrheic keratosis     Unspecified hemorrhoids without mention of complication     Unspecified hyperplasia of prostate with urinary obstruction and other lower urinary tract symptoms (LUTS)      Past Surgical History:   Procedure Laterality Date    ORTHOPEDIC SURGERY      LT ANKLE SURGERY    OTHER SURGICAL HISTORY Left 10/1996    ankle     OTHER SURGICAL HISTORY  14    biopsy-bladder/prostate    VASECTOMY  [id number]     Family History   Problem Relation Age of Onset    Diabetes Father     Stroke Father     Gall Bladder Disease Father     Cancer Mother         in lymph nodes/ breast     Social History     Tobacco Use    Smoking status: Former     Packs/day: 0.50     Types: Cigarettes     Quit date: 1973     Years since quittin.1    Smokeless tobacco: Former   Substance Use Topics    Alcohol use: Yes     Current Outpatient Medications   Medication Sig Dispense Refill    solifenacin (VESICARE) 5 MG tablet Take 1 tablet by mouth daily 90 tablet 3    rosuvastatin (CRESTOR) 10 MG tablet TAKE 1 TABLET BY MOUTH EVERY DAY AT NIGHT 90 tablet 3    alfuzosin (UROXATRAL) 10 MG extended release tablet TAKE 1 TABLET BY MOUTH EVERY DAY finasteride (PROSCAR) 5 MG tablet Take 5 mg by mouth daily      nitroGLYCERIN (NITROSTAT) 0.4 MG SL tablet Place 0.4 mg under the tongue every 5 minutes as needed      dicyclomine (BENTYL) 10 MG capsule Take 10 mg by mouth 3 times daily (Patient not taking: Reported on 2/22/2023)       No current facility-administered medications for this visit. Review of Systems   Constitutional: Negative for chills and fever. HENT:  Negative for ear discharge, hoarse voice and stridor. Eyes:  Negative for double vision and redness. Cardiovascular:  Negative for cyanosis and syncope. Respiratory:  Negative for hemoptysis and wheezing. Endocrine: Negative for polydipsia and polyphagia. Hematologic/Lymphatic: Negative for adenopathy. Skin:  Negative for itching and rash. Musculoskeletal:  Negative for joint swelling and muscle weakness. Gastrointestinal:  Negative for abdominal pain, hematemesis and hematochezia. Genitourinary:  Negative for flank pain and nocturia. Neurological:  Negative for focal weakness and seizures. Psychiatric/Behavioral:  Negative for altered mental status and suicidal ideas. Allergic/Immunologic: Negative for hives. PHYSICAL EXAM:    /60   Pulse 68   Ht 5' 11\" (1.803 m)   Wt 235 lb 12.8 oz (107 kg)   BMI 32.89 kg/m²      Physical Exam    General: Alert and oriented in no acute distress  HEENT: Head is normocephalic, atraumatic, pupils are equal bilaterally, throat appears to be clear  Neck: No significant jugular venous distention no cervical bruits  Cardiovascular: S1 and S2 heard, regular rate and rhythm, frequent extra beats noted no significant murmurs rubs or gallops. Respiratory: Clear to auscultation bilaterally with no adventitious sounds, respirations are normal  Abdomen: Soft, nontender, nondistended, bowel sounds present. Extremities: No cyanosis clubbing or edema  Peripheral pulses: Bilateral radial artery pulses are palpated.   Bilateral pedal pulses are well felt. Neuro: No facial droop and no gross focal motor deficits  Lymphatic: No significant cervical lymphadenopathy noted. Musculoskeletal: No significant redness or swelling noted in all exposed joints. Skin: No significant rashes noted the of the exposed regions. Medical problems and test results were reviewed with the patient today.      Recent Results (from the past 672 hour(s))   CBC with Auto Differential    Collection Time: 02/07/23 12:08 PM   Result Value Ref Range    WBC 8.1 4.3 - 11.1 K/uL    RBC 5.41 4.23 - 5.6 M/uL    Hemoglobin 15.1 13.6 - 17.2 g/dL    Hematocrit 45.5 41.1 - 50.3 %    MCV 84.1 82.0 - 102.0 FL    MCH 27.9 26.1 - 32.9 PG    MCHC 33.2 31.4 - 35.0 g/dL    RDW 13.2 11.9 - 14.6 %    Platelets 416 (L) 490 - 450 K/uL    MPV 9.8 9.4 - 12.3 FL    nRBC 0.00 0.0 - 0.2 K/uL    Differential Type AUTOMATED      Seg Neutrophils 67 43 - 78 %    Lymphocytes 21 13 - 44 %    Monocytes 10 4.0 - 12.0 %    Eosinophils % 1 0.5 - 7.8 %    Basophils 1 0.0 - 2.0 %    Immature Granulocytes 1 0.0 - 5.0 %    Segs Absolute 5.4 1.7 - 8.2 K/UL    Absolute Lymph # 1.7 0.5 - 4.6 K/UL    Absolute Mono # 0.8 0.1 - 1.3 K/UL    Absolute Eos # 0.1 0.0 - 0.8 K/UL    Basophils Absolute 0.1 0.0 - 0.2 K/UL    Absolute Immature Granulocyte 0.1 0.0 - 0.5 K/UL   Comprehensive Metabolic Panel    Collection Time: 02/07/23 12:08 PM   Result Value Ref Range    Sodium 141 133 - 143 mmol/L    Potassium 4.2 3.5 - 5.1 mmol/L    Chloride 112 (H) 101 - 110 mmol/L    CO2 25 21 - 32 mmol/L    Anion Gap 4 2 - 11 mmol/L    Glucose 97 65 - 100 mg/dL    BUN 20 8 - 23 MG/DL    Creatinine 1.10 0.8 - 1.5 MG/DL    Est, Glom Filt Rate >60 >60 ml/min/1.73m2    Calcium 9.7 8.3 - 10.4 MG/DL    Total Bilirubin 0.6 0.2 - 1.1 MG/DL    ALT 37 12 - 65 U/L    AST 14 (L) 15 - 37 U/L    Alk Phosphatase 59 50 - 136 U/L    Total Protein 7.0 6.3 - 8.2 g/dL    Albumin 3.7 3.2 - 4.6 g/dL    Globulin 3.3 2.8 - 4.5 g/dL    Albumin/Globulin Ratio 1.1 0.4 - 1.6     Path Review, Smear    Collection Time: 02/07/23 12:08 PM   Result Value Ref Range    Pathologist Review (NOTE)    Protime-INR    Collection Time: 02/07/23 12:08 PM   Result Value Ref Range    Protime 13.1 12.6 - 14.3 sec    INR 1.0     APTT    Collection Time: 02/07/23 12:08 PM   Result Value Ref Range    PTT 26.4 24.5 - 34.2 SEC   Fibrinogen    Collection Time: 02/07/23 12:08 PM   Result Value Ref Range    Fibrinogen 322 190 - 501 mg/dL   Lactate Dehydrogenase    Collection Time: 02/07/23 12:08 PM   Result Value Ref Range     110 - 210 U/L   Haptoglobin    Collection Time: 02/07/23 12:08 PM   Result Value Ref Range    Haptoglobin 108 30 - 200 mg/dL   LINSEY and PE, Serum    Collection Time: 02/07/23 12:08 PM   Result Value Ref Range    IgG, Serum 921 603 - 1,613 mg/dL    IgA 185 61 - 437 mg/dL    IgM 61 15 - 143 mg/dL    Total Protein 6.5 6.0 - 8.5 g/dL    Albumin 3.8 2.9 - 4.4 g/dL    Alpha 1 Globulin 0.2 0.0 - 0.4 g/dL    Alpha 2 Globulin 0.6 0.4 - 1.0 g/dL    BETA GLOBULIN 0.9 0.7 - 1.3 g/dL    GAMMA GLOBULIN 0.9 0.4 - 1.8 g/dL    M-Austyn Not Observed Not Observed g/dL    Globulin 2.7 2.2 - 3.9 g/dL    A/G Ratio 1.5 0.7 - 1.7      IMMUNOFIXATION RESULT Comment     Vitamin B12    Collection Time: 02/07/23 12:08 PM   Result Value Ref Range    Vitamin B-12 282 193 - 986 pg/mL   Folate    Collection Time: 02/07/23 12:08 PM   Result Value Ref Range    Folate 13.0 3.1 - 17.5 ng/mL   Methylmalonic Acid, Serum    Collection Time: 02/07/23 12:08 PM   Result Value Ref Range    Methylmalonic Acid 146 0 - 378 nmol/L   Hepatitis Panel, Acute    Collection Time: 02/07/23 12:08 PM   Result Value Ref Range    Hep A IgM NONREACTIVE NR      Hep B Core Ab, IgM NONREACTIVE NR      Hepatitis B Surface Ag NONREACTIVE NR      Hepatitis C Ab NONREACTIVE NR     HIV 1/2 Ag/Ab, 4TH Generation,W Rflx Confirm    Collection Time: 02/07/23 12:08 PM   Result Value Ref Range    HIV 1/2 Interp NONREACTIVE NR      HIV 1/2 Result Comment SEE NOTE       Lab Results   Component Value Date/Time    CHOL 116 10/26/2022 10:47 AM    HDL 31 10/26/2022 10:47 AM    VLDL 37 10/22/2021 11:06 AM   ,hemoglobin, basic metabolic panel, No results found for: TSH, TSH2, TSH3 ,  Lab Results   Component Value Date/Time     02/07/2023 12:08 PM    K 4.2 02/07/2023 12:08 PM     02/07/2023 12:08 PM    CO2 25 02/07/2023 12:08 PM    BUN 20 02/07/2023 12:08 PM    GFRAA 83 10/22/2021 11:06 AM    GLOB 3.3 02/07/2023 12:08 PM    GLOB 2.7 02/07/2023 12:08 PM    ALT 37 02/07/2023 12:08 PM    AST 14 02/07/2023 12:08 PM      Lab Results   Component Value Date    LDLCALC 32.4 10/26/2022      Lab Results   Component Value Date    CREATININE 1.10 02/07/2023      No results found for this or any previous visit. Lab Results   Component Value Date    HGB 15.1 02/07/2023      Lab Results   Component Value Date     (L) 02/07/2023      EKG: From 2/22/2023 showed sinus rhythm at 60 bpm, frequent PACs  ASSESSMENT and Michael Gonsales was seen today for other and hyperlipidemia. Diagnoses and all orders for this visit:    Precordial pain  -     Stress echocardiogram (TTE) exercise with contrast, bubble, strain, and 3D PRN order panel; Future  -     Transthoracic echocardiogram (TTE) complete with contrast, bubble, strain, and 3D PRN; Future  --Working this up with a baseline echo and stress echo to rule out inducible ischemia. Irregular heart beat  -     EKG 12 Lead  -     Extended cardiac holter monitor (48 hrs - 15 days); Future  -Getting 3-day Holter monitor    PAC (premature atrial contraction)  -     EKG 12 Lead  -     Extended cardiac holter monitor (48 hrs - 15 days); Future  -Noted on exam-fairly frequent-getting Holter monitor as mentioned above  Mixed hyperlipidemia  -Continue rosuvastatin    Thrombocytopenia ([address]   -Being monitored by hematology and worked up. Overall Impression  -Has PACs-irregular heartbeat at baseline.   Getting 3-day Holter monitor to rule out additional arrhythmia such as SVT/atrial fibrillation/flutter. In view of his precordial chest pain, we will get him through a stress echo especially with his baseline abnormal EKG as this should be able to rule out inducible ischemia and also get a baseline echo to rule out structural/valvular disease/pericardial effusion etc.  -Further plans will be per clinical course. We will see him in follow-up at the [facility] office with the results of his testing. Return in about 4 weeks (around 3/22/2023). Thank you Dr. Charla Barone For allowing us to participate in the care of your patient. If you have any further questions, please do not hesitate to contact us.   Sincerely,        Keisha Bailey MD   2/22/2023

## 2023-02-23 ENCOUNTER — TELEPHONE (OUTPATIENT)
Dept: CARDIOLOGY CLINIC | Age: 73
End: 2023-02-23

## 2023-02-23 NOTE — TELEPHONE ENCOUNTER
Pt wife states that the pt has an MRI tomorrow and needs to know if they need to take the monitor off.

## 2023-02-23 NOTE — TELEPHONE ENCOUNTER
Spoke with Mouna. Mouna said to let the patch dry then you can reapply to the skin and reactivate adhesive.     Tried to contact pt to let them know pt did not answer LVM to call back

## 2023-02-23 NOTE — TELEPHONE ENCOUNTER
Pt wife called back. Informed her how to reactivate monitor. Pt wife verbalized understanding and agreed to give us a call back if they have issues reattaching the monitor.

## 2023-02-24 ENCOUNTER — HOSPITAL ENCOUNTER (OUTPATIENT)
Dept: MRI IMAGING | Age: 73
End: 2023-02-24
Payer: MEDICARE

## 2023-02-24 DIAGNOSIS — K76.0 FATTY LIVER: ICD-10-CM

## 2023-02-24 DIAGNOSIS — D73.89 SPLENIC LESION: ICD-10-CM

## 2023-02-24 DIAGNOSIS — D69.6 THROMBOCYTOPENIA (HCC): ICD-10-CM

## 2023-02-24 PROCEDURE — 74183 MRI ABD W/O CNTR FLWD CNTR: CPT

## 2023-02-24 PROCEDURE — A9579 GAD-BASE MR CONTRAST NOS,1ML: HCPCS | Performed by: INTERNAL MEDICINE

## 2023-02-24 PROCEDURE — 6360000004 HC RX CONTRAST MEDICATION: Performed by: INTERNAL MEDICINE

## 2023-02-24 RX ADMIN — GADOTERIDOL 22 ML: 279.3 INJECTION, SOLUTION INTRAVENOUS at 09:41

## 2023-03-15 DIAGNOSIS — D69.6 THROMBOCYTOPENIA (HCC): Primary | ICD-10-CM

## 2023-03-16 RX ORDER — METOPROLOL SUCCINATE 25 MG/1
25 TABLET, EXTENDED RELEASE ORAL DAILY
Qty: 90 TABLET | Refills: 3 | Status: SHIPPED | OUTPATIENT
Start: 2023-03-16

## 2023-03-16 NOTE — TELEPHONE ENCOUNTER
----- Message from Merlin Grimes MD sent at 3/14/2023  6:26 PM EDT -----  Please let him know that we reviewed his 3-day Holter monitor and it showed sinus rhythm-the normal rhythm throughout with an average heart rate at 71 bpm.  -It did show frequent extra beats from the upper chambers of the heart-PACs-12% of all beats.  -I would recommend he start metoprolol succinate 25 mg once daily as this can be beneficial to prevent arrhythmia such as A-fib/atrial flutter with PACs that are this frequent. We will go over these results with him in detail at his follow-up appointment especially after we have completed his stress echo as scheduled on the 30th.   Thanks,  AD

## 2023-03-16 NOTE — TELEPHONE ENCOUNTER
Spoke with pt about monitor results per Dr. Basim Brown. Pt verbalized understanding and had no further questions.  Sent rx for Metoprolol to Dr. Basim Brown for signature and pt verified pharmacy

## 2023-03-23 ENCOUNTER — OFFICE VISIT (OUTPATIENT)
Dept: ONCOLOGY | Age: 73
End: 2023-03-23
Payer: MEDICARE

## 2023-03-23 ENCOUNTER — HOSPITAL ENCOUNTER (OUTPATIENT)
Dept: LAB | Age: 73
Discharge: HOME | End: 2023-03-23
Payer: MEDICARE

## 2023-03-23 VITALS
RESPIRATION RATE: 14 BRPM | DIASTOLIC BLOOD PRESSURE: 83 MMHG | SYSTOLIC BLOOD PRESSURE: 133 MMHG | OXYGEN SATURATION: 97 % | HEIGHT: 72 IN | WEIGHT: 234.4 LBS | BODY MASS INDEX: 31.75 KG/M2 | HEART RATE: 75 BPM | TEMPERATURE: 97.7 F

## 2023-03-23 DIAGNOSIS — D69.6 THROMBOCYTOPENIA (HCC): ICD-10-CM

## 2023-03-23 DIAGNOSIS — D69.6 THROMBOCYTOPENIA (HCC): Primary | ICD-10-CM

## 2023-03-23 LAB
ALBUMIN SERPL-MCNC: 3.7 G/DL (ref 3.2–4.6)
ALBUMIN/GLOB SERPL: 1.2 (ref 0.4–1.6)
ALP SERPL-CCNC: 63 U/L (ref 50–136)
ALT SERPL-CCNC: 40 U/L (ref 12–65)
ANION GAP SERPL CALC-SCNC: 6 MMOL/L (ref 2–11)
AST SERPL-CCNC: 16 U/L (ref 15–37)
BASOPHILS # BLD: 0.1 K/UL (ref 0–0.2)
BASOPHILS NFR BLD: 1 % (ref 0–2)
BILIRUB SERPL-MCNC: 0.5 MG/DL (ref 0.2–1.1)
BUN SERPL-MCNC: 26 MG/DL (ref 8–23)
CALCIUM SERPL-MCNC: 9.7 MG/DL (ref 8.3–10.4)
CHLORIDE SERPL-SCNC: 111 MMOL/L (ref 101–110)
CO2 SERPL-SCNC: 24 MMOL/L (ref 21–32)
CREAT SERPL-MCNC: 1.4 MG/DL (ref 0.8–1.5)
DIFFERENTIAL METHOD BLD: ABNORMAL
EOSINOPHIL # BLD: 0.2 K/UL (ref 0–0.8)
EOSINOPHIL NFR BLD: 2 % (ref 0.5–7.8)
ERYTHROCYTE [DISTWIDTH] IN BLOOD BY AUTOMATED COUNT: 13.2 % (ref 11.9–14.6)
GLOBULIN SER CALC-MCNC: 3.2 G/DL (ref 2.8–4.5)
GLUCOSE SERPL-MCNC: 148 MG/DL (ref 65–100)
HCT VFR BLD AUTO: 45.3 % (ref 41.1–50.3)
HGB BLD-MCNC: 14.9 G/DL (ref 13.6–17.2)
IMM GRANULOCYTES # BLD AUTO: 0.1 K/UL (ref 0–0.5)
IMM GRANULOCYTES NFR BLD AUTO: 1 % (ref 0–5)
LYMPHOCYTES # BLD: 1.3 K/UL (ref 0.5–4.6)
LYMPHOCYTES NFR BLD: 17 % (ref 13–44)
MCH RBC QN AUTO: 27.7 PG (ref 26.1–32.9)
MCHC RBC AUTO-ENTMCNC: 32.9 G/DL (ref 31.4–35)
MCV RBC AUTO: 84.4 FL (ref 82–102)
MONOCYTES # BLD: 0.7 K/UL (ref 0.1–1.3)
MONOCYTES NFR BLD: 10 % (ref 4–12)
NEUTS SEG # BLD: 5.1 K/UL (ref 1.7–8.2)
NEUTS SEG NFR BLD: 69 % (ref 43–78)
NRBC # BLD: 0 K/UL (ref 0–0.2)
PLATELET # BLD AUTO: 97 K/UL (ref 150–450)
PMV BLD AUTO: 10.1 FL (ref 9.4–12.3)
POTASSIUM SERPL-SCNC: 4 MMOL/L (ref 3.5–5.1)
PROT SERPL-MCNC: 6.9 G/DL (ref 6.3–8.2)
RBC # BLD AUTO: 5.37 M/UL (ref 4.23–5.6)
SODIUM SERPL-SCNC: 141 MMOL/L (ref 133–143)
WBC # BLD AUTO: 7.3 K/UL (ref 4.3–11.1)

## 2023-03-23 PROCEDURE — 36415 COLL VENOUS BLD VENIPUNCTURE: CPT

## 2023-03-23 PROCEDURE — G8484 FLU IMMUNIZE NO ADMIN: HCPCS | Performed by: INTERNAL MEDICINE

## 2023-03-23 PROCEDURE — 3017F COLORECTAL CA SCREEN DOC REV: CPT | Performed by: INTERNAL MEDICINE

## 2023-03-23 PROCEDURE — G8427 DOCREV CUR MEDS BY ELIG CLIN: HCPCS | Performed by: INTERNAL MEDICINE

## 2023-03-23 PROCEDURE — G8417 CALC BMI ABV UP PARAM F/U: HCPCS | Performed by: INTERNAL MEDICINE

## 2023-03-23 PROCEDURE — 85025 COMPLETE CBC W/AUTO DIFF WBC: CPT

## 2023-03-23 PROCEDURE — 1036F TOBACCO NON-USER: CPT | Performed by: INTERNAL MEDICINE

## 2023-03-23 PROCEDURE — 83521 IG LIGHT CHAINS FREE EACH: CPT

## 2023-03-23 PROCEDURE — 99214 OFFICE O/P EST MOD 30 MIN: CPT | Performed by: INTERNAL MEDICINE

## 2023-03-23 PROCEDURE — 80053 COMPREHEN METABOLIC PANEL: CPT

## 2023-03-23 PROCEDURE — 1123F ACP DISCUSS/DSCN MKR DOCD: CPT | Performed by: INTERNAL MEDICINE

## 2023-03-23 RX ORDER — AZITHROMYCIN 250 MG/1
TABLET, FILM COATED ORAL
COMMUNITY
Start: 2023-03-20

## 2023-03-23 ASSESSMENT — PATIENT HEALTH QUESTIONNAIRE - PHQ9
SUM OF ALL RESPONSES TO PHQ QUESTIONS 1-9: 0
SUM OF ALL RESPONSES TO PHQ9 QUESTIONS 1 & 2: 0
2. FEELING DOWN, DEPRESSED OR HOPELESS: 0
SUM OF ALL RESPONSES TO PHQ QUESTIONS 1-9: 0
1. LITTLE INTEREST OR PLEASURE IN DOING THINGS: 0

## 2023-03-23 NOTE — PATIENT INSTRUCTIONS
Patient Instructions from Today's Visit    Reason for Visit:  Follow up - Thrombocytopenia     Diagnosis Information:  [email or web address]    Plan:  Platelets steady declining. Bone marrow biopsy in interventional radiology recommended. Please call us if you have any questions or concerns before your next visit. Follow Up: Follow up in 3-4 weeks with Dr. Heather Encarnacion, with labs prior. Recent Lab Results:  No visits with results within 3 Day(s) from this visit. Latest known visit with results is: Ancillary Procedure on 02/22/2023   Component Date Value Ref Range Status    Body Surface Area 02/22/2023 2.31  m2 Final         Treatment Summary has been discussed and given to patient: N/A        -------------------------------------------------------------------------------------------------------------------  Please call our office at [phone] if you have any  of the following symptoms:   Fever of 100.5 or greater  Chills  Shortness of breath  Swelling or pain in one leg    After office hours an answering service is available and will contact a provider for emergencies or if you are experiencing any of the above symptoms. Patient does express an interest in My Chart. My Chart log in information explained on the after visit summary printout at the [facility] [address] desk.     Jaja Johnson RN

## 2023-03-23 NOTE — PROGRESS NOTES
1 tablet by mouth daily 90 tablet 3    solifenacin (VESICARE) 5 MG tablet Take 1 tablet by mouth daily 90 tablet 3    rosuvastatin (CRESTOR) 10 MG tablet TAKE 1 TABLET BY MOUTH EVERY DAY AT NIGHT 90 tablet 3    alfuzosin (UROXATRAL) 10 MG extended release tablet TAKE 1 TABLET BY MOUTH EVERY DAY      finasteride (PROSCAR) 5 MG tablet Take 5 mg by mouth daily      nitroGLYCERIN (NITROSTAT) 0.4 MG SL tablet Place 0.4 mg under the tongue every 5 minutes as needed      dicyclomine (BENTYL) 10 MG capsule Take 10 mg by mouth 3 times daily (Patient not taking: Reported on 2/22/2023)       No current facility-administered medications for this visit.         OBJECTIVE:  /83   Pulse 75   Temp 97.7 °F (36.5 °C) (Oral)   Resp 14   Ht 5' 11.75\" (1.822 m)   Wt 234 lb 6.4 oz (106.3 kg)   SpO2 97%   BMI 32.01 kg/m²   Wt Readings from Last 3 Encounters:   03/23/23 234 lb 6.4 oz (106.3 kg)   02/22/23 235 lb 12.8 oz (107 kg)   02/07/23 253 lb 4.8 oz (114.9 kg)       Physical Exam:  Patient alert and oriented x 3, in no acute distress  Integumentary: No Pallor, no icterus  HEENT: moist mucous membranes, normal oropharynx  Lymph nodes: Deferred  Cardiovascular:RRR, S1, S2 present, no m/r/g   Lungs: Clear to auscultation, no rales or wheezing, no accessory muscle use  Abdomen: Soft, and non-tender on palpation, no organomegaly, bowel sounds audible  Extremities: No peripheral edema, no joint swelling  Neurological: patient can follow commands and move all extremities    Labs:  Lab Results   Component Value Date    WBC 8.1 02/07/2023    HGB 15.1 02/07/2023    HCT 45.5 02/07/2023    MCV 84.1 02/07/2023     (L) 02/07/2023     Lab Results   Component Value Date    NEUTROABS 5.3 10/22/2021    LYMPHOPCT 21 02/07/2023    LYMPHSABS 1.7 02/07/2023    MONOPCT 10 02/07/2023    MONOSABS 0.8 02/07/2023    EOSABS 0.1 02/07/2023    BASOPCT 1 02/07/2023     Lab Results   Component Value Date     02/07/2023    K 4.2 02/07/2023

## 2023-03-24 DIAGNOSIS — D69.6 THROMBOCYTOPENIA (HCC): Primary | ICD-10-CM

## 2023-03-27 LAB
KAPPA LC FREE SER-MCNC: 22.4 MG/L (ref 3.3–19.4)
KAPPA LC FREE/LAMBDA FREE SER: 1.93 (ref 0.26–1.65)
LAMBDA LC FREE SERPL-MCNC: 11.6 MG/L (ref 5.7–26.3)

## 2023-04-06 ENCOUNTER — HOSPITAL ENCOUNTER (OUTPATIENT)
Dept: CT IMAGING | Age: 73
Discharge: HOME | End: 2023-04-06
Payer: MEDICARE

## 2023-04-06 VITALS
HEIGHT: 71 IN | RESPIRATION RATE: 16 BRPM | OXYGEN SATURATION: 96 % | SYSTOLIC BLOOD PRESSURE: 123 MMHG | WEIGHT: 234 LBS | DIASTOLIC BLOOD PRESSURE: 70 MMHG | BODY MASS INDEX: 32.76 KG/M2 | TEMPERATURE: 97.2 F | HEART RATE: 50 BPM

## 2023-04-06 DIAGNOSIS — D69.6 THROMBOCYTOPENIA (HCC): ICD-10-CM

## 2023-04-06 LAB
BASOPHILS # BLD: 0.1 K/UL (ref 0–0.2)
BASOPHILS NFR BLD: 1 % (ref 0–2)
BONE MARROW PREP & WRIGHT STAIN: NORMAL
DIFFERENTIAL METHOD BLD: ABNORMAL
EOSINOPHIL # BLD: 0.2 K/UL (ref 0–0.8)
EOSINOPHIL NFR BLD: 2 % (ref 0.5–7.8)
ERYTHROCYTE [DISTWIDTH] IN BLOOD BY AUTOMATED COUNT: 13.4 % (ref 11.9–14.6)
HCT VFR BLD AUTO: 45 % (ref 41.1–50.3)
HGB BLD-MCNC: 15.1 G/DL (ref 13.6–17.2)
IMM GRANULOCYTES # BLD AUTO: 0.1 K/UL (ref 0–0.5)
IMM GRANULOCYTES NFR BLD AUTO: 1 % (ref 0–5)
LYMPHOCYTES # BLD: 2.1 K/UL (ref 0.5–4.6)
LYMPHOCYTES NFR BLD: 24 % (ref 13–44)
MCH RBC QN AUTO: 28.3 PG (ref 26.1–32.9)
MCHC RBC AUTO-ENTMCNC: 33.6 G/DL (ref 31.4–35)
MCV RBC AUTO: 84.4 FL (ref 82–102)
MONOCYTES # BLD: 0.8 K/UL (ref 0.1–1.3)
MONOCYTES NFR BLD: 10 % (ref 4–12)
NEUTS SEG # BLD: 5.4 K/UL (ref 1.7–8.2)
NEUTS SEG NFR BLD: 63 % (ref 43–78)
NRBC # BLD: 0 K/UL (ref 0–0.2)
PLATELET # BLD AUTO: 137 K/UL (ref 150–450)
PMV BLD AUTO: 10.6 FL (ref 9.4–12.3)
RBC # BLD AUTO: 5.33 M/UL (ref 4.23–5.6)
WBC # BLD AUTO: 8.5 K/UL (ref 4.3–11.1)

## 2023-04-06 PROCEDURE — 88313 SPECIAL STAINS GROUP 2: CPT

## 2023-04-06 PROCEDURE — 88305 TISSUE EXAM BY PATHOLOGIST: CPT

## 2023-04-06 PROCEDURE — 2500000003 HC RX 250 WO HCPCS: Performed by: PHYSICIAN ASSISTANT

## 2023-04-06 PROCEDURE — 85025 COMPLETE CBC W/AUTO DIFF WBC: CPT

## 2023-04-06 PROCEDURE — 99152 MOD SED SAME PHYS/QHP 5/>YRS: CPT

## 2023-04-06 PROCEDURE — 6360000002 HC RX W HCPCS: Performed by: RADIOLOGY

## 2023-04-06 PROCEDURE — 88311 DECALCIFY TISSUE: CPT

## 2023-04-06 RX ORDER — MIDAZOLAM HYDROCHLORIDE 2 MG/2ML
INJECTION, SOLUTION INTRAMUSCULAR; INTRAVENOUS PRN
Status: COMPLETED | OUTPATIENT
Start: 2023-04-06 | End: 2023-04-06

## 2023-04-06 RX ORDER — LIDOCAINE HYDROCHLORIDE 10 MG/ML
INJECTION, SOLUTION EPIDURAL; INFILTRATION; INTRACAUDAL; PERINEURAL PRN
Status: COMPLETED | OUTPATIENT
Start: 2023-04-06 | End: 2023-04-06

## 2023-04-06 RX ORDER — FENTANYL CITRATE 50 UG/ML
INJECTION, SOLUTION INTRAMUSCULAR; INTRAVENOUS PRN
Status: COMPLETED | OUTPATIENT
Start: 2023-04-06 | End: 2023-04-06

## 2023-04-06 RX ADMIN — LIDOCAINE HYDROCHLORIDE 9 ML: 10 INJECTION, SOLUTION EPIDURAL; INFILTRATION; INTRACAUDAL; PERINEURAL at 14:00

## 2023-04-06 RX ADMIN — FENTANYL CITRATE 50 MCG: 50 INJECTION, SOLUTION INTRAMUSCULAR; INTRAVENOUS at 13:47

## 2023-04-06 RX ADMIN — FENTANYL CITRATE 50 MCG: 50 INJECTION, SOLUTION INTRAMUSCULAR; INTRAVENOUS at 13:54

## 2023-04-06 RX ADMIN — MIDAZOLAM HYDROCHLORIDE 1 MG: 1 INJECTION, SOLUTION INTRAMUSCULAR; INTRAVENOUS at 13:54

## 2023-04-06 RX ADMIN — MIDAZOLAM HYDROCHLORIDE 1 MG: 1 INJECTION, SOLUTION INTRAMUSCULAR; INTRAVENOUS at 13:47

## 2023-04-06 ASSESSMENT — PAIN - FUNCTIONAL ASSESSMENT: PAIN_FUNCTIONAL_ASSESSMENT: NONE - DENIES PAIN

## 2023-04-06 NOTE — OR NURSING
Recovery period without difficulty. Pt alert and oriented and denies pain. Dressing is clean, dry, and intact. Reviewed discharge instructions with patient and spouse, both verbalized understanding. Pt escorted to lobby discharge area via wheelchair. Vital signs and Harlan score completed.

## 2023-04-06 NOTE — DISCHARGE INSTRUCTIONS
while lying flat in bed. Please call your doctor as soon as you notice any of these symptoms; do not wait until your next office visit. Recognize signs and symptoms of STROKE:  F-face looks uneven    A-arms unable to move or move unevenly    S-speech slurred or non-existent    T-time-call 911 as soon as signs and symptoms begin-DO NOT go       Back to bed or wait to see if you get better-TIME IS BRAIN.

## 2023-04-06 NOTE — H&P
Department of Interventional Radiology  [phone]    History and Physical    Patient:  Edd Guzman MRN:  [id number]  SSN:  [id number]    YOB: 1950  Age:  67 y.o. Sex:  male      Primary Care Provider:  Mayela Singh MD  Referring Physician:  Anne-Marie Dickinson MD    Subjective:     Chief Complaint: Thrombocytopenia    History of the Present Illness: The patient is a 67 y.o. male who presents for CT-guided bone marrow aspiration and core bone biopsy under moderate sedation. Patient is being worked up for thrombocytopenia by Dr. Monica Haile. In the past 6 months he has noticed bruising easily from minor trauma and mildly low platelet count between 112,000-122,000. Patient is n.p.o. today and denies taking any blood thinners      Past Medical History:   Diagnosis Date    Calculus of kidney     Elevated prostate specific antigen (PSA)     Other and unspecified hyperlipidemia     Other convulsions     Other seborrheic keratosis     Unspecified hemorrhoids without mention of complication     Unspecified hyperplasia of prostate with urinary obstruction and other lower urinary tract symptoms (LUTS)      Past Surgical History:   Procedure Laterality Date    ORTHOPEDIC SURGERY      LT ANKLE SURGERY    OTHER SURGICAL HISTORY Left 10/1996    ankle     OTHER SURGICAL HISTORY  04/01/14    biopsy-bladder/prostate    VASECTOMY  [id number]        Review of Systems:    Pertinent items are noted in HPI. Prior to Admission medications    Medication Sig Start Date End Date Taking?  Authorizing Provider   azithromycin (ZITHROMAX) 250 MG tablet  3/20/23   Historical Provider, MD   metoprolol succinate (TOPROL XL) 25 MG extended release tablet Take 1 tablet by mouth daily 3/16/23   Edy Nixon MD   solifenacin (VESICARE) 5 MG tablet Take 1 tablet by mouth daily 10/26/22   Mayeal Singh MD   rosuvastatin (CRESTOR) 10 MG tablet TAKE 1 TABLET BY MOUTH EVERY DAY AT NIGHT 7/25/22   [facility]

## 2023-04-06 NOTE — PRE SEDATION
dicyclomine (BENTYL) 10 MG capsule Take 10 mg by mouth 3 times daily  Patient not taking: Reported on 2/22/2023 6/14/21   Ar Automatic Reconciliation   finasteride (PROSCAR) 5 MG tablet Take 5 mg by mouth daily    Ar Automatic Reconciliation   nitroGLYCERIN (NITROSTAT) 0.4 MG SL tablet Place 0.4 mg under the tongue every 5 minutes as needed 10/9/18   Ar Automatic Reconciliation     Pre-Sedation Documentation and Exam:   I have personally completed a history, physical exam & review of systems for this patient (see notes). Vital signs have been reviewed (see flow sheet for vitals).     Mallampati Airway Assessment:  normal, dentition not prohibitive, Mallampati Class II - (soft palate, fauces & uvula are visible)    Prior History of Anesthesia Complications:   none    ASA Classification:  Class 2 - A normal healthy patient with mild systemic disease    Sedation/ Anesthesia Plan:   intravenous sedation    Medications Planned:   midazolam (Versed) intravenously and fentanyl intravenously    Patient is an appropriate candidate for plan of sedation: yes    Electronically signed by MARC Maciel on 4/6/2023 at 1:32 PM

## 2023-04-06 NOTE — BRIEF OP NOTE
Department of Interventional Radiology  [phone]        Interventional Radiology Brief Procedure Note    Patient: Peggy Tony MRN: [id number]  SSN: [id number]    YOB: 1950  Age: 67 y.o. Sex: male      Date of Procedure: 4/6/2023    Pre-Procedure Diagnosis: Thrombocytopenia    Post-Procedure Diagnosis: SAME    Procedure(s): Image Guided Biopsy    Brief Description of Procedure: See CT guided bone marrow aspiration and core bone biopsy    Performed By: MARC Ruelas     Assistants: None    Anesthesia:Moderate Sedation was given under the direction and supervision of Dr. Tavo Nicholas MD.  Total face-to-face time 18 minutes. IV Versed and fentanyl were given    Estimated Blood Loss: Less than 10ml    Specimens: Bone marrow aspirate and core bone biopsy were obtained    Implants:  None    Findings: Routine CT-guided bone marrow biopsy and aspiration    Complications: None    Recommendations: Bedrest minimum 30 minutes.   Leave dressing on for 2 days    Follow Up: RN    Signed By: Taylor Ruelas     April 6, 2023

## 2023-04-14 PROBLEM — D69.6 THROMBOCYTOPENIA, UNSPECIFIED (HCC): Status: ACTIVE | Noted: 2023-04-14

## 2023-04-18 ENCOUNTER — HOSPITAL ENCOUNTER (OUTPATIENT)
Dept: LAB | Age: 73
Discharge: HOME | End: 2023-04-21
Payer: MEDICARE

## 2023-04-18 ENCOUNTER — OFFICE VISIT (OUTPATIENT)
Dept: ONCOLOGY | Age: 73
End: 2023-04-18
Payer: MEDICARE

## 2023-04-18 VITALS
HEART RATE: 53 BPM | HEIGHT: 72 IN | OXYGEN SATURATION: 98 % | SYSTOLIC BLOOD PRESSURE: 126 MMHG | RESPIRATION RATE: 16 BRPM | DIASTOLIC BLOOD PRESSURE: 74 MMHG | BODY MASS INDEX: 32.13 KG/M2 | WEIGHT: 237.2 LBS | TEMPERATURE: 97.9 F

## 2023-04-18 DIAGNOSIS — D69.6 THROMBOCYTOPENIA (HCC): Primary | ICD-10-CM

## 2023-04-18 DIAGNOSIS — D69.6 THROMBOCYTOPENIA (HCC): ICD-10-CM

## 2023-04-18 LAB
ALBUMIN SERPL-MCNC: 3.8 G/DL (ref 3.2–4.6)
ALBUMIN/GLOB SERPL: 1.2 (ref 0.4–1.6)
ALP SERPL-CCNC: 63 U/L (ref 50–136)
ALT SERPL-CCNC: 37 U/L (ref 12–65)
ANION GAP SERPL CALC-SCNC: 7 MMOL/L (ref 2–11)
AST SERPL-CCNC: 15 U/L (ref 15–37)
BASOPHILS # BLD: 0.1 K/UL (ref 0–0.2)
BASOPHILS NFR BLD: 1 % (ref 0–2)
BILIRUB SERPL-MCNC: 0.5 MG/DL (ref 0.2–1.1)
BUN SERPL-MCNC: 20 MG/DL (ref 8–23)
CALCIUM SERPL-MCNC: 9.5 MG/DL (ref 8.3–10.4)
CHLORIDE SERPL-SCNC: 113 MMOL/L (ref 101–110)
CO2 SERPL-SCNC: 23 MMOL/L (ref 21–32)
CREAT SERPL-MCNC: 1.2 MG/DL (ref 0.8–1.5)
DIFFERENTIAL METHOD BLD: ABNORMAL
EOSINOPHIL # BLD: 0.2 K/UL (ref 0–0.8)
EOSINOPHIL NFR BLD: 2 % (ref 0.5–7.8)
ERYTHROCYTE [DISTWIDTH] IN BLOOD BY AUTOMATED COUNT: 13.3 % (ref 11.9–14.6)
GLOBULIN SER CALC-MCNC: 3.3 G/DL (ref 2.8–4.5)
GLUCOSE SERPL-MCNC: 92 MG/DL (ref 65–100)
HCT VFR BLD AUTO: 45.3 % (ref 41.1–50.3)
HGB BLD-MCNC: 14.7 G/DL (ref 13.6–17.2)
IMM GRANULOCYTES # BLD AUTO: 0.1 K/UL (ref 0–0.5)
IMM GRANULOCYTES NFR BLD AUTO: 1 % (ref 0–5)
LYMPHOCYTES # BLD: 2 K/UL (ref 0.5–4.6)
LYMPHOCYTES NFR BLD: 23 % (ref 13–44)
MCH RBC QN AUTO: 27.6 PG (ref 26.1–32.9)
MCHC RBC AUTO-ENTMCNC: 32.5 G/DL (ref 31.4–35)
MCV RBC AUTO: 85 FL (ref 82–102)
MONOCYTES # BLD: 0.8 K/UL (ref 0.1–1.3)
MONOCYTES NFR BLD: 10 % (ref 4–12)
NEUTS SEG # BLD: 5.4 K/UL (ref 1.7–8.2)
NEUTS SEG NFR BLD: 63 % (ref 43–78)
NRBC # BLD: 0 K/UL (ref 0–0.2)
PLATELET # BLD AUTO: 109 K/UL (ref 150–450)
PMV BLD AUTO: 10.1 FL (ref 9.4–12.3)
POTASSIUM SERPL-SCNC: 4.3 MMOL/L (ref 3.5–5.1)
PROT SERPL-MCNC: 7.1 G/DL (ref 6.3–8.2)
RBC # BLD AUTO: 5.33 M/UL (ref 4.23–5.6)
SODIUM SERPL-SCNC: 143 MMOL/L (ref 133–143)
WBC # BLD AUTO: 8.4 K/UL (ref 4.3–11.1)

## 2023-04-18 PROCEDURE — 36415 COLL VENOUS BLD VENIPUNCTURE: CPT

## 2023-04-18 PROCEDURE — 80053 COMPREHEN METABOLIC PANEL: CPT

## 2023-04-18 PROCEDURE — 99214 OFFICE O/P EST MOD 30 MIN: CPT | Performed by: INTERNAL MEDICINE

## 2023-04-18 PROCEDURE — G8427 DOCREV CUR MEDS BY ELIG CLIN: HCPCS | Performed by: INTERNAL MEDICINE

## 2023-04-18 PROCEDURE — G8417 CALC BMI ABV UP PARAM F/U: HCPCS | Performed by: INTERNAL MEDICINE

## 2023-04-18 PROCEDURE — 1036F TOBACCO NON-USER: CPT | Performed by: INTERNAL MEDICINE

## 2023-04-18 PROCEDURE — 85025 COMPLETE CBC W/AUTO DIFF WBC: CPT

## 2023-04-18 PROCEDURE — 1123F ACP DISCUSS/DSCN MKR DOCD: CPT | Performed by: INTERNAL MEDICINE

## 2023-04-18 PROCEDURE — 3017F COLORECTAL CA SCREEN DOC REV: CPT | Performed by: INTERNAL MEDICINE

## 2023-04-18 ASSESSMENT — PATIENT HEALTH QUESTIONNAIRE - PHQ9
SUM OF ALL RESPONSES TO PHQ QUESTIONS 1-9: 0
SUM OF ALL RESPONSES TO PHQ QUESTIONS 1-9: 0
1. LITTLE INTEREST OR PLEASURE IN DOING THINGS: 0
SUM OF ALL RESPONSES TO PHQ QUESTIONS 1-9: 0
SUM OF ALL RESPONSES TO PHQ9 QUESTIONS 1 & 2: 0
SUM OF ALL RESPONSES TO PHQ QUESTIONS 1-9: 0
2. FEELING DOWN, DEPRESSED OR HOPELESS: 0

## 2023-04-18 NOTE — PATIENT INSTRUCTIONS
Patient Instructions from Today's Visit    Reason for Visit:  Follow up - Thrombocytopenia     Diagnosis Information:  [email or web address]/. net/about-us/asco-answers-patient-education-materials/ingq-kcwgzqw-zydh-sheets    Plan: You bone marrow biopsy results were normal - no evidence of bone marrow disease. 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 Treatment is not required unless platelets drop below 30,000. We will continue to monitor your counts. Please call us if you have any questions or concerns before your next visit. Follow Up: Follow up in 2.5 months with Dr. Alana Mejía, with labs prior.      Recent Lab Results:  Hospital Outpatient Visit on 04/18/2023   Component Date Value Ref Range Status    WBC 04/18/2023 8.4  4.3 - 11.1 K/uL Final    RBC 04/18/2023 5.33  4.23 - 5.6 M/uL Final    Hemoglobin 04/18/2023 14.7  13.6 - 17.2 g/dL Final    Hematocrit 04/18/2023 45.3  41.1 - 50.3 % Final    MCV 04/18/2023 85.0  82.0 - 102.0 FL Final    MCH 04/18/2023 27.6  26.1 - 32.9 PG Final    MCHC 04/18/2023 32.5  31.4 - 35.0 g/dL Final    RDW 04/18/2023 13.3  11.9 - 14.6 % Final    Platelets 95/52/8323 109 (L)  150 - 450 K/uL Final    MPV 04/18/2023 10.1  9.4 - 12.3 FL Final    nRBC 04/18/2023 0.00  0.0 - 0.2 K/uL Final    **Note: Absolute NRBC parameter is now reported with Hemogram**    Seg Neutrophils 04/18/2023 63  43 - 78 % Final    Lymphocytes 04/18/2023 23  13 - 44 % Final    Monocytes 04/18/2023 10  4.0 - 12.0 % Final    Eosinophils % 04/18/2023 2  0.5 - 7.8 % Final    Basophils 04/18/2023 1  0.0 - 2.0 % Final    Immature Granulocytes 04/18/2023 1  0.0 - 5.0 % Final    Segs Absolute 04/18/2023 5.4  1.7 - 8.2 K/UL Final    Absolute Lymph # 04/18/2023 2.0  0.5 - 4.6 K/UL Final    Absolute Mono # 04/18/2023 0.8  0.1 - 1.3 K/UL Final    Absolute Eos # 04/18/2023 0.2  0.0 - 0.8 K/UL Final    Basophils Absolute 04/18/2023 0.1  0.0 - 0.2 K/UL Final    Absolute Immature

## 2023-06-26 ENCOUNTER — OFFICE VISIT (OUTPATIENT)
Dept: ONCOLOGY | Age: 73
End: 2023-06-26
Payer: MEDICARE

## 2023-06-26 ENCOUNTER — HOSPITAL ENCOUNTER (OUTPATIENT)
Dept: LAB | Age: 73
Discharge: HOME | End: 2023-06-29
Payer: MEDICARE

## 2023-06-26 VITALS
RESPIRATION RATE: 18 BRPM | DIASTOLIC BLOOD PRESSURE: 75 MMHG | HEART RATE: 58 BPM | BODY MASS INDEX: 31.54 KG/M2 | OXYGEN SATURATION: 97 % | HEIGHT: 72 IN | SYSTOLIC BLOOD PRESSURE: 115 MMHG | TEMPERATURE: 98 F | WEIGHT: 232.9 LBS

## 2023-06-26 DIAGNOSIS — D69.6 THROMBOCYTOPENIA (HCC): ICD-10-CM

## 2023-06-26 DIAGNOSIS — D69.6 THROMBOCYTOPENIA (HCC): Primary | ICD-10-CM

## 2023-06-26 LAB
ALBUMIN SERPL-MCNC: 3.8 G/DL (ref 3.2–4.6)
ALBUMIN/GLOB SERPL: 1.1 (ref 0.4–1.6)
ALP SERPL-CCNC: 65 U/L (ref 50–136)
ALT SERPL-CCNC: 38 U/L (ref 12–65)
ANION GAP SERPL CALC-SCNC: 3 MMOL/L (ref 2–11)
AST SERPL-CCNC: 17 U/L (ref 15–37)
BASOPHILS # BLD: 0.1 K/UL (ref 0–0.2)
BASOPHILS NFR BLD: 1 % (ref 0–2)
BILIRUB SERPL-MCNC: 0.8 MG/DL (ref 0.2–1.1)
BUN SERPL-MCNC: 19 MG/DL (ref 8–23)
CALCIUM SERPL-MCNC: 10 MG/DL (ref 8.3–10.4)
CHLORIDE SERPL-SCNC: 111 MMOL/L (ref 101–110)
CO2 SERPL-SCNC: 27 MMOL/L (ref 21–32)
CREAT SERPL-MCNC: 1.3 MG/DL (ref 0.8–1.5)
DIFFERENTIAL METHOD BLD: ABNORMAL
EOSINOPHIL # BLD: 0.2 K/UL (ref 0–0.8)
EOSINOPHIL NFR BLD: 2 % (ref 0.5–7.8)
ERYTHROCYTE [DISTWIDTH] IN BLOOD BY AUTOMATED COUNT: 13.3 % (ref 11.9–14.6)
GLOBULIN SER CALC-MCNC: 3.4 G/DL (ref 2.8–4.5)
GLUCOSE SERPL-MCNC: 118 MG/DL (ref 65–100)
HCT VFR BLD AUTO: 44.8 % (ref 41.1–50.3)
HGB BLD-MCNC: 14.9 G/DL (ref 13.6–17.2)
IMM GRANULOCYTES # BLD AUTO: 0.1 K/UL (ref 0–0.5)
IMM GRANULOCYTES NFR BLD AUTO: 1 % (ref 0–5)
LYMPHOCYTES # BLD: 2 K/UL (ref 0.5–4.6)
LYMPHOCYTES NFR BLD: 24 % (ref 13–44)
MCH RBC QN AUTO: 27.8 PG (ref 26.1–32.9)
MCHC RBC AUTO-ENTMCNC: 33.3 G/DL (ref 31.4–35)
MCV RBC AUTO: 83.6 FL (ref 82–102)
MONOCYTES # BLD: 0.8 K/UL (ref 0.1–1.3)
MONOCYTES NFR BLD: 10 % (ref 4–12)
NEUTS SEG # BLD: 5.3 K/UL (ref 1.7–8.2)
NEUTS SEG NFR BLD: 62 % (ref 43–78)
NRBC # BLD: 0 K/UL (ref 0–0.2)
PLATELET # BLD AUTO: 115 K/UL (ref 150–450)
PMV BLD AUTO: 9.6 FL (ref 9.4–12.3)
POTASSIUM SERPL-SCNC: 3.9 MMOL/L (ref 3.5–5.1)
PROT SERPL-MCNC: 7.2 G/DL (ref 6.3–8.2)
RBC # BLD AUTO: 5.36 M/UL (ref 4.23–5.6)
SODIUM SERPL-SCNC: 141 MMOL/L (ref 133–143)
WBC # BLD AUTO: 8.4 K/UL (ref 4.3–11.1)

## 2023-06-26 PROCEDURE — 83520 IMMUNOASSAY QUANT NOS NONAB: CPT

## 2023-06-26 PROCEDURE — 1123F ACP DISCUSS/DSCN MKR DOCD: CPT | Performed by: INTERNAL MEDICINE

## 2023-06-26 PROCEDURE — 1036F TOBACCO NON-USER: CPT | Performed by: INTERNAL MEDICINE

## 2023-06-26 PROCEDURE — 36415 COLL VENOUS BLD VENIPUNCTURE: CPT

## 2023-06-26 PROCEDURE — 86147 CARDIOLIPIN ANTIBODY EA IG: CPT

## 2023-06-26 PROCEDURE — 99213 OFFICE O/P EST LOW 20 MIN: CPT | Performed by: INTERNAL MEDICINE

## 2023-06-26 PROCEDURE — 85025 COMPLETE CBC W/AUTO DIFF WBC: CPT

## 2023-06-26 PROCEDURE — 86148 ANTI-PHOSPHOLIPID ANTIBODY: CPT

## 2023-06-26 PROCEDURE — G8427 DOCREV CUR MEDS BY ELIG CLIN: HCPCS | Performed by: INTERNAL MEDICINE

## 2023-06-26 PROCEDURE — 3017F COLORECTAL CA SCREEN DOC REV: CPT | Performed by: INTERNAL MEDICINE

## 2023-06-26 PROCEDURE — 80053 COMPREHEN METABOLIC PANEL: CPT

## 2023-06-26 PROCEDURE — G8417 CALC BMI ABV UP PARAM F/U: HCPCS | Performed by: INTERNAL MEDICINE

## 2023-06-26 ASSESSMENT — PATIENT HEALTH QUESTIONNAIRE - PHQ9
2. FEELING DOWN, DEPRESSED OR HOPELESS: 1
SUM OF ALL RESPONSES TO PHQ QUESTIONS 1-9: 2
SUM OF ALL RESPONSES TO PHQ9 QUESTIONS 1 & 2: 2
1. LITTLE INTEREST OR PLEASURE IN DOING THINGS: 1
SUM OF ALL RESPONSES TO PHQ QUESTIONS 1-9: 2

## 2023-06-27 LAB
CARDIOLIPIN IGA SER IA-ACNC: <9 APL U/ML (ref 0–11)
CARDIOLIPIN IGG SER IA-ACNC: <9 GPL U/ML (ref 0–14)
CARDIOLIPIN IGM SER IA-ACNC: <9 MPL U/ML (ref 0–12)
P ETHANOLAMINE AB, IGA: NORMAL U/ML
P ETHANOLAMINE AB, IGG: NORMAL U/ML
P ETHANOLAMINE AB, IGM: NORMAL U/ML
P GLYCEROL IGA: NORMAL U/ML
P GLYCEROL IGG: NORMAL U/ML
P GLYCEROL IGM: NORMAL U/ML
PS IGA SER-ACNC: 2 APS UNITS (ref 0–19)
PS IGG SER-ACNC: 9 UNITS (ref 0–30)
PS IGM SER-ACNC: <10 UNITS (ref 0–30)

## 2023-07-03 RX ORDER — ROSUVASTATIN CALCIUM 10 MG/1
TABLET, COATED ORAL
Qty: 90 TABLET | Refills: 3 | Status: SHIPPED | OUTPATIENT
Start: 2023-07-03

## 2023-07-06 LAB
CARDIOLIPIN IGA SER IA-ACNC: <9 APL U/ML (ref 0–11)
CARDIOLIPIN IGG SER IA-ACNC: <9 GPL U/ML (ref 0–14)
CARDIOLIPIN IGM SER IA-ACNC: <9 MPL U/ML (ref 0–12)
P ETHANOLAMINE AB, IGA: 1.3 U/ML
P ETHANOLAMINE AB, IGG: 2.8 U/ML
P ETHANOLAMINE AB, IGM: 4.2 U/ML
P GLYCEROL IGA: 1.8 U/ML
P GLYCEROL IGG: 4.6 U/ML
P GLYCEROL IGM: 1.4 U/ML
PS IGA SER-ACNC: 2 APS UNITS (ref 0–19)
PS IGG SER-ACNC: 9 UNITS (ref 0–30)
PS IGM SER-ACNC: <10 UNITS (ref 0–30)

## 2023-07-20 ENCOUNTER — OFFICE VISIT (OUTPATIENT)
Age: 73
End: 2023-07-20
Payer: MEDICARE

## 2023-07-20 VITALS
HEIGHT: 72 IN | BODY MASS INDEX: 31.67 KG/M2 | WEIGHT: 233.8 LBS | HEART RATE: 72 BPM | SYSTOLIC BLOOD PRESSURE: 112 MMHG | DIASTOLIC BLOOD PRESSURE: 68 MMHG

## 2023-07-20 DIAGNOSIS — E78.2 MIXED HYPERLIPIDEMIA: ICD-10-CM

## 2023-07-20 DIAGNOSIS — I49.1 PAC (PREMATURE ATRIAL CONTRACTION): ICD-10-CM

## 2023-07-20 DIAGNOSIS — I20.0 PROGRESSIVE ANGINA (HCC): Primary | ICD-10-CM

## 2023-07-20 DIAGNOSIS — D69.6 THROMBOCYTOPENIA (HCC): ICD-10-CM

## 2023-07-20 PROCEDURE — G8417 CALC BMI ABV UP PARAM F/U: HCPCS | Performed by: INTERNAL MEDICINE

## 2023-07-20 PROCEDURE — 3017F COLORECTAL CA SCREEN DOC REV: CPT | Performed by: INTERNAL MEDICINE

## 2023-07-20 PROCEDURE — 1036F TOBACCO NON-USER: CPT | Performed by: INTERNAL MEDICINE

## 2023-07-20 PROCEDURE — 99214 OFFICE O/P EST MOD 30 MIN: CPT | Performed by: INTERNAL MEDICINE

## 2023-07-20 PROCEDURE — G8427 DOCREV CUR MEDS BY ELIG CLIN: HCPCS | Performed by: INTERNAL MEDICINE

## 2023-07-20 PROCEDURE — 1123F ACP DISCUSS/DSCN MKR DOCD: CPT | Performed by: INTERNAL MEDICINE

## 2023-07-20 RX ORDER — NITROGLYCERIN 0.4 MG/1
0.4 TABLET SUBLINGUAL EVERY 5 MIN PRN
Qty: 25 TABLET | Refills: 1 | Status: SHIPPED | OUTPATIENT
Start: 2023-07-20

## 2023-07-20 RX ORDER — ROSUVASTATIN CALCIUM 10 MG/1
10 TABLET, COATED ORAL NIGHTLY
Qty: 90 TABLET | Refills: 3 | Status: SHIPPED | OUTPATIENT
Start: 2023-07-20

## 2023-07-20 RX ORDER — ASPIRIN 81 MG/1
81 TABLET ORAL DAILY
COMMUNITY

## 2023-07-20 RX ORDER — METOPROLOL SUCCINATE 25 MG/1
25 TABLET, EXTENDED RELEASE ORAL DAILY
Qty: 90 TABLET | Refills: 3 | Status: SHIPPED | OUTPATIENT
Start: 2023-07-20

## 2023-07-20 ASSESSMENT — ENCOUNTER SYMPTOMS
HEMOPTYSIS: 0
HEMATOCHEZIA: 0
HOARSE VOICE: 0
DOUBLE VISION: 0
WHEEZING: 0
STRIDOR: 0
ABDOMINAL PAIN: 0
HEMATEMESIS: 0
EYE REDNESS: 0

## 2023-07-20 NOTE — PROGRESS NOTES
echo showed wall motion abnormalities compared to baseline- Severe stress induced corrine-septal wall hypokinesis. Baseline Left Ventricle: Normal left ventricular systolic function with a visually estimated EF of 60 - 65%. Left ventricle size is normal. Mildly increased wall thickness. Findings consistent with mild concentric hypertrophy. Normal wall motion. Normal diastolic function. Average E/e' ratio is 9.78. Contrast used: Definity. ECG: The ECG was borderline for diagnosis. Stress Test: A Kris protocol stress test was performed. Blood pressure demonstrated a normal response to stress. He achieved 10.1 Mets. Signed by: Dora Hurley MD on 3/31/2023  8:19 AM     Lab Results   Component Value Date    HGB 14.9 06/26/2023      Lab Results   Component Value Date     (L) 06/26/2023        ASSESSMENT and PLAN    Progressive angina (HCC)  -Continue metoprolol succinate. Planning coronary angiography, left heart catheterization and possible PCI, continue sublingual nitroglycerin as needed, continue aspirin 81 mg once daily-had abnormal stress echocardiogram with inducible ischemia. Thrombocytopenia, unspecified  Follows with hematology-stable and improving. No significant easy bruising or bleeding. Irregular heart beat  Continue metoprolol succinate. Secondary to PACs    PAC (premature atrial contraction)  Continue metoprolol succinate. Over 10% of all beats were PACs on Holter monitor    Mixed hyperlipidemia  Continue rosuvastatin therapy. Overall Impression  -He continues to have progressive angina and with his abnormal stress echo, now that he is no longer having any hematuria and has been evaluated by urology and cleared along with hematology, I started him on aspirin 81 mg once daily and set him up for coronary angiography, left heart catheterization and possible PCI. Already on metoprolol succinate and nitroglycerin as needed for dual antianginal therapy.   Already

## 2023-07-21 NOTE — PROGRESS NOTES
Patient pre-assessment complete for [facility] scheduled for Dr John Schultz, arrival time 0730. Patient verified using . Patient instructed to bring a list of all home medications on the day of procedure. NPO status reinforced. Patient informed to take a full dose aspirin 325mg  or 81 mg x 4 on the day of procedure. Instructed they can take all other medications excluding vitamins & supplements. Patient verbalizes understanding of all instructions & denies any questions at this time.

## 2023-07-24 ENCOUNTER — HOSPITAL ENCOUNTER (OUTPATIENT)
Age: 73
Setting detail: OUTPATIENT SURGERY
Discharge: HOME | End: 2023-07-24
Attending: INTERNAL MEDICINE | Admitting: INTERNAL MEDICINE
Payer: MEDICARE

## 2023-07-24 VITALS
OXYGEN SATURATION: 95 % | SYSTOLIC BLOOD PRESSURE: 136 MMHG | RESPIRATION RATE: 16 BRPM | HEIGHT: 71 IN | BODY MASS INDEX: 32.62 KG/M2 | DIASTOLIC BLOOD PRESSURE: 82 MMHG | WEIGHT: 233 LBS | TEMPERATURE: 97.8 F | HEART RATE: 60 BPM

## 2023-07-24 DIAGNOSIS — I20.0 UNSTABLE ANGINA (HCC): ICD-10-CM

## 2023-07-24 LAB
ANION GAP SERPL CALC-SCNC: 5 MMOL/L (ref 2–11)
BUN SERPL-MCNC: 19 MG/DL (ref 8–23)
CALCIUM SERPL-MCNC: 9.9 MG/DL (ref 8.3–10.4)
CHLORIDE SERPL-SCNC: 114 MMOL/L (ref 101–110)
CO2 SERPL-SCNC: 26 MMOL/L (ref 21–32)
CREAT SERPL-MCNC: 1.3 MG/DL (ref 0.8–1.5)
ECHO BSA: 2.3 M2
EKG ATRIAL RATE: 59 BPM
EKG DIAGNOSIS: NORMAL
EKG P AXIS: 30 DEGREES
EKG P-R INTERVAL: 144 MS
EKG Q-T INTERVAL: 416 MS
EKG QRS DURATION: 92 MS
EKG QTC CALCULATION (BAZETT): 411 MS
EKG R AXIS: -13 DEGREES
EKG T AXIS: 37 DEGREES
EKG VENTRICULAR RATE: 59 BPM
ERYTHROCYTE [DISTWIDTH] IN BLOOD BY AUTOMATED COUNT: 13.4 % (ref 11.9–14.6)
GLUCOSE SERPL-MCNC: 101 MG/DL (ref 65–100)
HCT VFR BLD AUTO: 47.5 % (ref 41.1–50.3)
HGB BLD-MCNC: 15.4 G/DL (ref 13.6–17.2)
MAGNESIUM SERPL-MCNC: 1.9 MG/DL (ref 1.8–2.4)
MCH RBC QN AUTO: 27.7 PG (ref 26.1–32.9)
MCHC RBC AUTO-ENTMCNC: 32.4 G/DL (ref 31.4–35)
MCV RBC AUTO: 85.6 FL (ref 82–102)
NRBC # BLD: 0 K/UL (ref 0–0.2)
PLATELET # BLD AUTO: 122 K/UL (ref 150–450)
PMV BLD AUTO: 9.6 FL (ref 9.4–12.3)
POTASSIUM SERPL-SCNC: 3.8 MMOL/L (ref 3.5–5.1)
RBC # BLD AUTO: 5.55 M/UL (ref 4.23–5.6)
SODIUM SERPL-SCNC: 145 MMOL/L (ref 133–143)
WBC # BLD AUTO: 9.2 K/UL (ref 4.3–11.1)

## 2023-07-24 PROCEDURE — 93458 L HRT ARTERY/VENTRICLE ANGIO: CPT | Performed by: INTERNAL MEDICINE

## 2023-07-24 PROCEDURE — 93005 ELECTROCARDIOGRAM TRACING: CPT | Performed by: INTERNAL MEDICINE

## 2023-07-24 PROCEDURE — 2580000003 HC RX 258: Performed by: INTERNAL MEDICINE

## 2023-07-24 PROCEDURE — 2500000003 HC RX 250 WO HCPCS: Performed by: INTERNAL MEDICINE

## 2023-07-24 PROCEDURE — 83735 ASSAY OF MAGNESIUM: CPT

## 2023-07-24 PROCEDURE — 6360000002 HC RX W HCPCS: Performed by: INTERNAL MEDICINE

## 2023-07-24 PROCEDURE — 85027 COMPLETE CBC AUTOMATED: CPT

## 2023-07-24 PROCEDURE — 99152 MOD SED SAME PHYS/QHP 5/>YRS: CPT | Performed by: INTERNAL MEDICINE

## 2023-07-24 PROCEDURE — 2709999900 HC NON-CHARGEABLE SUPPLY: Performed by: INTERNAL MEDICINE

## 2023-07-24 PROCEDURE — 80048 BASIC METABOLIC PNL TOTAL CA: CPT

## 2023-07-24 PROCEDURE — 93010 ELECTROCARDIOGRAM REPORT: CPT | Performed by: INTERNAL MEDICINE

## 2023-07-24 PROCEDURE — 6360000004 HC RX CONTRAST MEDICATION: Performed by: INTERNAL MEDICINE

## 2023-07-24 PROCEDURE — C1894 INTRO/SHEATH, NON-LASER: HCPCS | Performed by: INTERNAL MEDICINE

## 2023-07-24 PROCEDURE — C1769 GUIDE WIRE: HCPCS | Performed by: INTERNAL MEDICINE

## 2023-07-24 RX ORDER — SODIUM CHLORIDE 9 MG/ML
INJECTION, SOLUTION INTRAVENOUS CONTINUOUS
Status: DISCONTINUED | OUTPATIENT
Start: 2023-07-24 | End: 2023-07-24 | Stop reason: HOSPADM

## 2023-07-24 RX ORDER — ACETAMINOPHEN 325 MG/1
650 TABLET ORAL EVERY 4 HOURS PRN
Status: DISCONTINUED | OUTPATIENT
Start: 2023-07-24 | End: 2023-07-24 | Stop reason: HOSPADM

## 2023-07-24 RX ORDER — ASPIRIN 81 MG/1
324 TABLET, CHEWABLE ORAL ONCE
Status: DISCONTINUED | OUTPATIENT
Start: 2023-07-24 | End: 2023-07-24 | Stop reason: HOSPADM

## 2023-07-24 RX ORDER — SODIUM CHLORIDE 9 MG/ML
INJECTION, SOLUTION INTRAVENOUS PRN
Status: DISCONTINUED | OUTPATIENT
Start: 2023-07-24 | End: 2023-07-24 | Stop reason: HOSPADM

## 2023-07-24 RX ORDER — SODIUM CHLORIDE 0.9 % (FLUSH) 0.9 %
5-40 SYRINGE (ML) INJECTION PRN
Status: DISCONTINUED | OUTPATIENT
Start: 2023-07-24 | End: 2023-07-24 | Stop reason: HOSPADM

## 2023-07-24 RX ORDER — MIDAZOLAM HYDROCHLORIDE 1 MG/ML
INJECTION INTRAMUSCULAR; INTRAVENOUS PRN
Status: DISCONTINUED | OUTPATIENT
Start: 2023-07-24 | End: 2023-07-24 | Stop reason: HOSPADM

## 2023-07-24 RX ORDER — LIDOCAINE HYDROCHLORIDE 10 MG/ML
INJECTION, SOLUTION INFILTRATION; PERINEURAL PRN
Status: DISCONTINUED | OUTPATIENT
Start: 2023-07-24 | End: 2023-07-24 | Stop reason: HOSPADM

## 2023-07-24 RX ORDER — HEPARIN SODIUM 200 [USP'U]/100ML
INJECTION, SOLUTION INTRAVENOUS CONTINUOUS PRN
Status: DISCONTINUED | OUTPATIENT
Start: 2023-07-24 | End: 2023-07-24 | Stop reason: HOSPADM

## 2023-07-24 RX ORDER — SODIUM CHLORIDE 0.9 % (FLUSH) 0.9 %
5-40 SYRINGE (ML) INJECTION EVERY 12 HOURS SCHEDULED
Status: DISCONTINUED | OUTPATIENT
Start: 2023-07-24 | End: 2023-07-24 | Stop reason: HOSPADM

## 2023-07-24 RX ADMIN — SODIUM CHLORIDE: 900 INJECTION, SOLUTION INTRAVENOUS at 07:58

## 2023-07-24 NOTE — PROGRESS NOTES
TRANSFER - OUT REPORT:    Verbal report given to RN on Melquiades Espino.  being transferred to [facility] for routine progression of patient care       Report consisted of patient's Situation, Background, Assessment and   Recommendations(SBAR). Information from the following report(s) Nurse Handoff Report and MAR was reviewed with the receiving nurse.       [facility] w/ Dr. Geronimo Espana  No interventions  R radial access  TR band to R radial @12mL  No s/sxs of bleeding or hematoma to R radial access site    Heparin 5,000 units IC  Versed 4mg IV  Fentanyl 50mcg IV

## 2023-07-24 NOTE — PROGRESS NOTES
Patient received to [facility] room # 12  Ambulatory from [facility]. Patient scheduled for [facility] today with Dr Jasson Rodriguez. Procedure reviewed & questions answered, voiced good understanding consent obtained & placed on chart. All medications and medical history reviewed. Will prep patient per orders. Patient & family updated on plan of care. The patient has a fraility score of 3-MANAGING WELL, based on ambulation. 324mg of Aspirin taken at 0600.

## 2023-07-24 NOTE — PROGRESS NOTES
Assisted OOB & ambulated to BR & on unit. Tolerated activity without difficulty. Right wrist without bleeding or hematoma R band deflation completed. Right  radial dressed with quick clot and tegaderm using sterile technique. No bleeding or hematoma. Tolerated without difficultyDischarge instructions given per orders, voiced good understanding of meds & follow up care. Denies any questions.

## 2023-07-24 NOTE — PROGRESS NOTES
Report received from [facility]. Procedural finding communicated. Intra procedural medication administration reviewed. Progression of care discussed. Patient received into CPRU room 1, Post LHC    Access site without bleeding or swelling. Right wrist    Patient instructed to limit movement of right upper extremity. Routine post procedural vital signs & site assessment initiated.

## 2023-07-24 NOTE — DISCHARGE INSTRUCTIONS
operate heavy machinery for the remainder of the day     Rest when you feel tired. Getting enough sleep will help you recover. Diet    You can eat your normal diet, unless your doctor gives you other instructions. If your stomach is upset, try clear liquids and bland, low-fat foods like plain toast or rice. Drink plenty of fluids (unless your doctor tells you not to). Don't drink alcohol for 24 hours. Medicines    Be safe with medicines. Read and follow all instructions on the label. If the doctor gave you a prescription medicine for pain, take it as prescribed. If you are not taking a prescription pain medicine, ask your doctor if you can take an over-the-counter medicine. If you think your pain medicine is making you sick to your stomach: Take your medicine after meals (unless your doctor has told you not to). Ask your doctor for a different pain medicine. I have read the above instructions and have had the opportunity to ask questions.       Patient: ________________________   Date: _____________    Witness: _______________________   Date: _____________

## 2023-07-31 NOTE — RESULT ENCOUNTER NOTE
Dear Mr. eWldonene Ben,  I reviewed the results of your heart catheterization and it looked good with normal coronary arteries for the most and the pressures also within your heart were normal.  These findings are reassuring and indicate that if you ever have any further significant chest discomfort at this point, it is likely noncardiac in origin.   Sincerely,  Heidi Huitron MD

## 2023-09-27 ENCOUNTER — OFFICE VISIT (OUTPATIENT)
Age: 73
End: 2023-09-27
Payer: MEDICARE

## 2023-09-27 VITALS
WEIGHT: 234 LBS | HEIGHT: 71 IN | HEART RATE: 53 BPM | BODY MASS INDEX: 32.76 KG/M2 | SYSTOLIC BLOOD PRESSURE: 139 MMHG | DIASTOLIC BLOOD PRESSURE: 70 MMHG

## 2023-09-27 DIAGNOSIS — I49.9 IRREGULAR HEART BEAT: ICD-10-CM

## 2023-09-27 DIAGNOSIS — I49.1 PAC (PREMATURE ATRIAL CONTRACTION): ICD-10-CM

## 2023-09-27 DIAGNOSIS — E78.2 MIXED HYPERLIPIDEMIA: ICD-10-CM

## 2023-09-27 DIAGNOSIS — R07.2 PRECORDIAL PAIN: Primary | ICD-10-CM

## 2023-09-27 PROCEDURE — 3017F COLORECTAL CA SCREEN DOC REV: CPT | Performed by: INTERNAL MEDICINE

## 2023-09-27 PROCEDURE — 1123F ACP DISCUSS/DSCN MKR DOCD: CPT | Performed by: INTERNAL MEDICINE

## 2023-09-27 PROCEDURE — G8417 CALC BMI ABV UP PARAM F/U: HCPCS | Performed by: INTERNAL MEDICINE

## 2023-09-27 PROCEDURE — G8427 DOCREV CUR MEDS BY ELIG CLIN: HCPCS | Performed by: INTERNAL MEDICINE

## 2023-09-27 PROCEDURE — 99214 OFFICE O/P EST MOD 30 MIN: CPT | Performed by: INTERNAL MEDICINE

## 2023-09-27 PROCEDURE — 1036F TOBACCO NON-USER: CPT | Performed by: INTERNAL MEDICINE

## 2023-09-27 RX ORDER — ROSUVASTATIN CALCIUM 10 MG/1
10 TABLET, COATED ORAL NIGHTLY
Qty: 90 TABLET | Refills: 3 | Status: SHIPPED | OUTPATIENT
Start: 2023-09-27

## 2023-09-27 RX ORDER — NITROGLYCERIN 0.4 MG/1
0.4 TABLET SUBLINGUAL EVERY 5 MIN PRN
Qty: 25 TABLET | Refills: 1 | Status: SHIPPED | OUTPATIENT
Start: 2023-09-27

## 2023-09-27 RX ORDER — METOPROLOL SUCCINATE 25 MG/1
25 TABLET, EXTENDED RELEASE ORAL DAILY
Qty: 90 TABLET | Refills: 3 | Status: SHIPPED | OUTPATIENT
Start: 2023-09-27

## 2023-09-27 ASSESSMENT — ENCOUNTER SYMPTOMS
HOARSE VOICE: 0
STRIDOR: 0
HEMATOCHEZIA: 0
WHEEZING: 0
HEMOPTYSIS: 0
ABDOMINAL PAIN: 0
EYE REDNESS: 0
HEMATEMESIS: 0
DOUBLE VISION: 0

## 2023-09-27 NOTE — PROGRESS NOTES
significant murmurs rubs or gallops. Respiratory: Clear to auscultation bilaterally with no adventitious sounds, respirations are normal  Abdomen: Soft, nontender, nondistended, bowel sounds present. Extremities: No cyanosis clubbing or edema  Peripheral pulses: Bilateral radial artery pulses are palpated. Bilateral pedal pulses are well felt. Neuro: No facial droop and no gross focal motor deficits  Lymphatic: No significant cervical lymphadenopathy noted. Musculoskeletal: No significant redness or swelling noted in all exposed joints. Skin: No significant rashes noted the of the exposed regions. Medical problems and test results were reviewed with the patient today. Lab Results   Component Value Date/Time    CHOL 116 10/26/2022 10:47 AM    HDL 31 10/26/2022 10:47 AM    VLDL 37 10/22/2021 11:06 AM   ,hemoglobin, basic metabolic panel, No results found for: \"TSH\", \"TSH2\", \"TSH3\" ,  Lab Results   Component Value Date/Time     07/24/2023 07:47 AM    K 3.8 07/24/2023 07:47 AM     07/24/2023 07:47 AM    CO2 26 07/24/2023 07:47 AM    BUN 19 07/24/2023 07:47 AM    GFRAA 83 10/22/2021 11:06 AM    GLOB 3.4 06/26/2023 10:54 AM    ALT 38 06/26/2023 10:54 AM    AST 17 06/26/2023 10:54 AM      Lab Results   Component Value Date    LDLCALC 32.4 10/26/2022      Lab Results   Component Value Date    CREATININE 1.30 07/24/2023 03/30/23    STRESS ECHOCARDIOGRAM EXERCISE (CONTRAST/BUBBLE PRN) 03/31/2023  8:19 AM (Final)    Interpretation Summary    Study Impression: Consistent with severe anteroseptal ischemia. Post-stress Echo: The post-stress echo showed wall motion abnormalities compared to baseline- Severe stress induced corrine-septal wall hypokinesis. Baseline Left Ventricle: Normal left ventricular systolic function with a visually estimated EF of 60 - 65%. Left ventricle size is normal. Mildly increased wall thickness. Findings consistent with mild concentric hypertrophy.  Normal wall

## 2023-10-31 ENCOUNTER — OFFICE VISIT (OUTPATIENT)
Dept: INTERNAL MEDICINE CLINIC | Facility: CLINIC | Age: 73
End: 2023-10-31
Payer: MEDICARE

## 2023-10-31 VITALS
HEART RATE: 62 BPM | RESPIRATION RATE: 16 BRPM | HEIGHT: 71 IN | TEMPERATURE: 97.3 F | BODY MASS INDEX: 33.23 KG/M2 | DIASTOLIC BLOOD PRESSURE: 76 MMHG | SYSTOLIC BLOOD PRESSURE: 119 MMHG | WEIGHT: 237.4 LBS

## 2023-10-31 DIAGNOSIS — R91.1 LUNG NODULE: ICD-10-CM

## 2023-10-31 DIAGNOSIS — Z00.00 MEDICARE ANNUAL WELLNESS VISIT, SUBSEQUENT: Primary | ICD-10-CM

## 2023-10-31 DIAGNOSIS — L29.0 PRURITUS ANI: ICD-10-CM

## 2023-10-31 DIAGNOSIS — R53.82 CHRONIC FATIGUE, UNSPECIFIED: ICD-10-CM

## 2023-10-31 DIAGNOSIS — D69.6 THROMBOCYTOPENIA (HCC): ICD-10-CM

## 2023-10-31 DIAGNOSIS — N40.1 BENIGN PROSTATIC HYPERPLASIA WITH LOWER URINARY TRACT SYMPTOMS, SYMPTOM DETAILS UNSPECIFIED: ICD-10-CM

## 2023-10-31 DIAGNOSIS — R53.82 CHRONIC FATIGUE: ICD-10-CM

## 2023-10-31 DIAGNOSIS — D69.6 THROMBOCYTOPENIA, UNSPECIFIED (HCC): ICD-10-CM

## 2023-10-31 DIAGNOSIS — E78.49 OTHER HYPERLIPIDEMIA: ICD-10-CM

## 2023-10-31 DIAGNOSIS — B35.1 ONYCHOMYCOSIS: ICD-10-CM

## 2023-10-31 PROBLEM — R06.00 DYSPNEA: Status: RESOLVED | Noted: 2018-09-20 | Resolved: 2023-10-31

## 2023-10-31 PROBLEM — I20.0 UNSTABLE ANGINA (HCC): Status: RESOLVED | Noted: 2023-07-20 | Resolved: 2023-10-31

## 2023-10-31 PROBLEM — I49.9 IRREGULAR HEART BEAT: Status: RESOLVED | Noted: 2023-02-22 | Resolved: 2023-10-31

## 2023-10-31 PROBLEM — L98.9 SKIN LESION: Status: RESOLVED | Noted: 2017-03-06 | Resolved: 2023-10-31

## 2023-10-31 PROBLEM — R07.89 CHEST DISCOMFORT: Status: RESOLVED | Noted: 2019-01-03 | Resolved: 2023-10-31

## 2023-10-31 LAB
ALBUMIN SERPL-MCNC: 3.9 G/DL (ref 3.2–4.6)
ALBUMIN/GLOB SERPL: 1.3 (ref 0.4–1.6)
ALP SERPL-CCNC: 64 U/L (ref 50–136)
ALT SERPL-CCNC: 38 U/L (ref 12–65)
ANION GAP SERPL CALC-SCNC: 7 MMOL/L (ref 2–11)
AST SERPL-CCNC: 16 U/L (ref 15–37)
BASOPHILS # BLD: 0.1 K/UL (ref 0–0.2)
BASOPHILS NFR BLD: 1 % (ref 0–2)
BILIRUB SERPL-MCNC: 0.8 MG/DL (ref 0.2–1.1)
BUN SERPL-MCNC: 22 MG/DL (ref 8–23)
CALCIUM SERPL-MCNC: 9.9 MG/DL (ref 8.3–10.4)
CHLORIDE SERPL-SCNC: 111 MMOL/L (ref 101–110)
CHOLEST SERPL-MCNC: 114 MG/DL
CO2 SERPL-SCNC: 24 MMOL/L (ref 21–32)
CREAT SERPL-MCNC: 1.3 MG/DL (ref 0.8–1.5)
DIFFERENTIAL METHOD BLD: ABNORMAL
EOSINOPHIL # BLD: 0.1 K/UL (ref 0–0.8)
EOSINOPHIL NFR BLD: 2 % (ref 0.5–7.8)
ERYTHROCYTE [DISTWIDTH] IN BLOOD BY AUTOMATED COUNT: 13 % (ref 11.9–14.6)
GLOBULIN SER CALC-MCNC: 2.9 G/DL (ref 2.8–4.5)
GLUCOSE SERPL-MCNC: 106 MG/DL (ref 65–100)
HCT VFR BLD AUTO: 45 % (ref 41.1–50.3)
HDLC SERPL-MCNC: 36 MG/DL (ref 40–60)
HDLC SERPL: 3.2
HGB BLD-MCNC: 15 G/DL (ref 13.6–17.2)
IMM GRANULOCYTES # BLD AUTO: 0 K/UL (ref 0–0.5)
IMM GRANULOCYTES NFR BLD AUTO: 1 % (ref 0–5)
LDLC SERPL CALC-MCNC: 20.6 MG/DL
LYMPHOCYTES # BLD: 1.8 K/UL (ref 0.5–4.6)
LYMPHOCYTES NFR BLD: 22 % (ref 13–44)
MCH RBC QN AUTO: 28 PG (ref 26.1–32.9)
MCHC RBC AUTO-ENTMCNC: 33.3 G/DL (ref 31.4–35)
MCV RBC AUTO: 84 FL (ref 82–102)
MONOCYTES # BLD: 0.9 K/UL (ref 0.1–1.3)
MONOCYTES NFR BLD: 10 % (ref 4–12)
NEUTS SEG # BLD: 5.4 K/UL (ref 1.7–8.2)
NEUTS SEG NFR BLD: 64 % (ref 43–78)
NRBC # BLD: 0 K/UL (ref 0–0.2)
PLATELET # BLD AUTO: 138 K/UL (ref 150–450)
PMV BLD AUTO: 10.3 FL (ref 9.4–12.3)
POTASSIUM SERPL-SCNC: 4.1 MMOL/L (ref 3.5–5.1)
PROT SERPL-MCNC: 6.8 G/DL (ref 6.3–8.2)
PSA SERPL-MCNC: 3.7 NG/ML
RBC # BLD AUTO: 5.36 M/UL (ref 4.23–5.6)
SODIUM SERPL-SCNC: 142 MMOL/L (ref 133–143)
TRIGL SERPL-MCNC: 287 MG/DL (ref 35–150)
VLDLC SERPL CALC-MCNC: 57.4 MG/DL (ref 6–23)
WBC # BLD AUTO: 8.3 K/UL (ref 4.3–11.1)

## 2023-10-31 PROCEDURE — 1036F TOBACCO NON-USER: CPT | Performed by: INTERNAL MEDICINE

## 2023-10-31 PROCEDURE — G8417 CALC BMI ABV UP PARAM F/U: HCPCS | Performed by: INTERNAL MEDICINE

## 2023-10-31 PROCEDURE — G0439 PPPS, SUBSEQ VISIT: HCPCS | Performed by: INTERNAL MEDICINE

## 2023-10-31 PROCEDURE — 3017F COLORECTAL CA SCREEN DOC REV: CPT | Performed by: INTERNAL MEDICINE

## 2023-10-31 PROCEDURE — G8484 FLU IMMUNIZE NO ADMIN: HCPCS | Performed by: INTERNAL MEDICINE

## 2023-10-31 PROCEDURE — 1123F ACP DISCUSS/DSCN MKR DOCD: CPT | Performed by: INTERNAL MEDICINE

## 2023-10-31 PROCEDURE — 99214 OFFICE O/P EST MOD 30 MIN: CPT | Performed by: INTERNAL MEDICINE

## 2023-10-31 PROCEDURE — G8427 DOCREV CUR MEDS BY ELIG CLIN: HCPCS | Performed by: INTERNAL MEDICINE

## 2023-10-31 RX ORDER — HYDROCORTISONE 25 MG/G
CREAM TOPICAL
Qty: 30 G | Refills: 1 | Status: SHIPPED | OUTPATIENT
Start: 2023-10-31

## 2023-10-31 RX ORDER — SOLIFENACIN SUCCINATE 5 MG/1
5 TABLET, FILM COATED ORAL DAILY
Qty: 90 TABLET | Refills: 3 | Status: SHIPPED | OUTPATIENT
Start: 2023-10-31

## 2023-10-31 SDOH — ECONOMIC STABILITY: INCOME INSECURITY: HOW HARD IS IT FOR YOU TO PAY FOR THE VERY BASICS LIKE FOOD, HOUSING, MEDICAL CARE, AND HEATING?: NOT VERY HARD

## 2023-10-31 SDOH — ECONOMIC STABILITY: FOOD INSECURITY: WITHIN THE PAST 12 MONTHS, THE FOOD YOU BOUGHT JUST DIDN'T LAST AND YOU DIDN'T HAVE MONEY TO GET MORE.: NEVER TRUE

## 2023-10-31 SDOH — ECONOMIC STABILITY: FOOD INSECURITY: WITHIN THE PAST 12 MONTHS, YOU WORRIED THAT YOUR FOOD WOULD RUN OUT BEFORE YOU GOT MONEY TO BUY MORE.: NEVER TRUE

## 2023-10-31 SDOH — ECONOMIC STABILITY: HOUSING INSECURITY
IN THE LAST 12 MONTHS, WAS THERE A TIME WHEN YOU DID NOT HAVE A STEADY PLACE TO SLEEP OR SLEPT IN A SHELTER (INCLUDING NOW)?: NO

## 2023-10-31 ASSESSMENT — PATIENT HEALTH QUESTIONNAIRE - PHQ9
SUM OF ALL RESPONSES TO PHQ QUESTIONS 1-9: 0
1. LITTLE INTEREST OR PLEASURE IN DOING THINGS: 0
SUM OF ALL RESPONSES TO PHQ9 QUESTIONS 1 & 2: 0
SUM OF ALL RESPONSES TO PHQ QUESTIONS 1-9: 0
2. FEELING DOWN, DEPRESSED OR HOPELESS: 0

## 2023-10-31 NOTE — PROGRESS NOTES
Medicare Annual Wellness Visit    Gay Haile. is here for Medicare AWV (subsequent), Annual Exam (Pt presents to the office today for their annual exam. ), and Hematuria (Passed some bright red blood back on 8/11/2023; had a little bit of burning as well. Thinking it was a stone. It was only that one time. Been thinking about switching his urologist, when he passed the blood he called the urologist and they couldn't get him for 2 weeks and by the time he saw the doctor the blood was gone)    Assessment & Plan   Medicare annual wellness visit, subsequent  Recommendations for Preventive Services Due: see orders and patient instructions/AVS.  Recommended screening schedule for the next 5-10 years is provided to the patient in written form: see Patient Instructions/AVS.     No follow-ups on file. Subjective   The following acute and/or chronic problems were also addressed today:   Diagnosis Orders   1. Medicare annual wellness visit, subsequent  PSA, Diagnostic    PSA, Diagnostic      2. Thrombocytopenia ([address])        3. Other hyperlipidemia  Lipid Panel    Lipid Panel      4. Chronic fatigue, unspecified        5. Benign prostatic hyperplasia with lower urinary tract symptoms, symptom details unspecified  PSA, Diagnostic    PSA, Diagnostic    nocturia x3-4      6. Lung nodule        7. Thrombocytopenia, unspecified        8. Pruritus ani  hydrocortisone (ANUSOL-HC) 2.5 % CREA rectal cream      9. Chronic fatigue  Comprehensive Metabolic Panel    CBC with Auto Differential    CBC with Auto Differential    Comprehensive Metabolic Panel      10. Onychomycosis             Patient's complete Health Risk Assessment and screening values have been reviewed and are found in Flowsheets. The following problems were reviewed today and where indicated follow up appointments were made and/or referrals ordered. Positive Risk Factor Screenings with Interventions:       Cognitive:    Words recalled: 2 Words Recalled

## 2024-01-10 ENCOUNTER — OFFICE VISIT (OUTPATIENT)
Dept: ONCOLOGY | Age: 74
End: 2024-01-10
Payer: MEDICARE

## 2024-01-10 ENCOUNTER — HOSPITAL ENCOUNTER (OUTPATIENT)
Dept: LAB | Age: 74
Discharge: HOME | End: 2024-01-13
Payer: MEDICARE

## 2024-01-10 VITALS
RESPIRATION RATE: 16 BRPM | OXYGEN SATURATION: 95 % | DIASTOLIC BLOOD PRESSURE: 75 MMHG | HEIGHT: 72 IN | TEMPERATURE: 97.5 F | WEIGHT: 246.4 LBS | HEART RATE: 86 BPM | BODY MASS INDEX: 33.38 KG/M2 | SYSTOLIC BLOOD PRESSURE: 119 MMHG

## 2024-01-10 DIAGNOSIS — D69.6 THROMBOCYTOPENIA (HCC): Primary | ICD-10-CM

## 2024-01-10 DIAGNOSIS — D69.6 THROMBOCYTOPENIA (HCC): ICD-10-CM

## 2024-01-10 LAB
ALBUMIN SERPL-MCNC: 3.7 G/DL (ref 3.2–4.6)
ALBUMIN/GLOB SERPL: 1.1 (ref 0.4–1.6)
ALP SERPL-CCNC: 67 U/L (ref 50–136)
ALT SERPL-CCNC: 50 U/L (ref 12–65)
ANION GAP SERPL CALC-SCNC: 3 MMOL/L (ref 2–11)
AST SERPL-CCNC: 23 U/L (ref 15–37)
BASOPHILS # BLD: 0.1 K/UL (ref 0–0.2)
BASOPHILS NFR BLD: 1 % (ref 0–2)
BILIRUB SERPL-MCNC: 0.8 MG/DL (ref 0.2–1.1)
BUN SERPL-MCNC: 23 MG/DL (ref 8–23)
CALCIUM SERPL-MCNC: 9.6 MG/DL (ref 8.3–10.4)
CHLORIDE SERPL-SCNC: 109 MMOL/L (ref 103–113)
CO2 SERPL-SCNC: 27 MMOL/L (ref 21–32)
CREAT SERPL-MCNC: 1.4 MG/DL (ref 0.8–1.5)
DIFFERENTIAL METHOD BLD: ABNORMAL
EOSINOPHIL # BLD: 0.2 K/UL (ref 0–0.8)
EOSINOPHIL NFR BLD: 2 % (ref 0.5–7.8)
ERYTHROCYTE [DISTWIDTH] IN BLOOD BY AUTOMATED COUNT: 13.2 % (ref 11.9–14.6)
GLOBULIN SER CALC-MCNC: 3.4 G/DL (ref 2.8–4.5)
GLUCOSE SERPL-MCNC: 125 MG/DL (ref 65–100)
HCT VFR BLD AUTO: 46 % (ref 41.1–50.3)
HGB BLD-MCNC: 15.2 G/DL (ref 13.6–17.2)
IMM GRANULOCYTES # BLD AUTO: 0.1 K/UL (ref 0–0.5)
IMM GRANULOCYTES NFR BLD AUTO: 2 % (ref 0–5)
LYMPHOCYTES # BLD: 1.7 K/UL (ref 0.5–4.6)
LYMPHOCYTES NFR BLD: 22 % (ref 13–44)
MCH RBC QN AUTO: 27.5 PG (ref 26.1–32.9)
MCHC RBC AUTO-ENTMCNC: 33 G/DL (ref 31.4–35)
MCV RBC AUTO: 83.3 FL (ref 82–102)
MONOCYTES # BLD: 0.7 K/UL (ref 0.1–1.3)
MONOCYTES NFR BLD: 9 % (ref 4–12)
NEUTS SEG # BLD: 5.2 K/UL (ref 1.7–8.2)
NEUTS SEG NFR BLD: 64 % (ref 43–78)
NRBC # BLD: 0 K/UL (ref 0–0.2)
PLATELET # BLD AUTO: 113 K/UL (ref 150–450)
PMV BLD AUTO: 9.8 FL (ref 9.4–12.3)
POTASSIUM SERPL-SCNC: 3.8 MMOL/L (ref 3.5–5.1)
PROT SERPL-MCNC: 7.1 G/DL (ref 6.3–8.2)
RBC # BLD AUTO: 5.52 M/UL (ref 4.23–5.6)
SODIUM SERPL-SCNC: 139 MMOL/L (ref 136–146)
WBC # BLD AUTO: 8 K/UL (ref 4.3–11.1)

## 2024-01-10 PROCEDURE — G8427 DOCREV CUR MEDS BY ELIG CLIN: HCPCS | Performed by: INTERNAL MEDICINE

## 2024-01-10 PROCEDURE — 1036F TOBACCO NON-USER: CPT | Performed by: INTERNAL MEDICINE

## 2024-01-10 PROCEDURE — 99213 OFFICE O/P EST LOW 20 MIN: CPT | Performed by: INTERNAL MEDICINE

## 2024-01-10 PROCEDURE — 85025 COMPLETE CBC W/AUTO DIFF WBC: CPT

## 2024-01-10 PROCEDURE — 1123F ACP DISCUSS/DSCN MKR DOCD: CPT | Performed by: INTERNAL MEDICINE

## 2024-01-10 PROCEDURE — 80053 COMPREHEN METABOLIC PANEL: CPT

## 2024-01-10 PROCEDURE — G8417 CALC BMI ABV UP PARAM F/U: HCPCS | Performed by: INTERNAL MEDICINE

## 2024-01-10 PROCEDURE — 36415 COLL VENOUS BLD VENIPUNCTURE: CPT

## 2024-01-10 PROCEDURE — 3017F COLORECTAL CA SCREEN DOC REV: CPT | Performed by: INTERNAL MEDICINE

## 2024-01-10 PROCEDURE — G8484 FLU IMMUNIZE NO ADMIN: HCPCS | Performed by: INTERNAL MEDICINE

## 2024-01-10 NOTE — PATIENT INSTRUCTIONS
Patient Instructions from Today's Visit    Reason for Visit:  Follow-up    Plan:  Labs reviewed today. Your platelets went down a little bit, but we do not need to do any treatment at this time.    Follow Up:  Follow up in 6 months for repeat labs and office visit with our Nurse Practitioner.    Recent Lab Results:  Hospital Outpatient Visit on 01/10/2024   Component Date Value Ref Range Status    WBC 01/10/2024 8.0  4.3 - 11.1 K/uL Final    RBC 01/10/2024 5.52  4.23 - 5.6 M/uL Final    Hemoglobin 01/10/2024 15.2  13.6 - 17.2 g/dL Final    Hematocrit 01/10/2024 46.0  41.1 - 50.3 % Final    MCV 01/10/2024 83.3  82.0 - 102.0 FL Final    MCH 01/10/2024 27.5  26.1 - 32.9 PG Final    MCHC 01/10/2024 33.0  31.4 - 35.0 g/dL Final    RDW 01/10/2024 13.2  11.9 - 14.6 % Final    Platelets 01/10/2024 113 (L)  150 - 450 K/uL Final    MPV 01/10/2024 9.8  9.4 - 12.3 FL Final    nRBC 01/10/2024 0.00  0.0 - 0.2 K/uL Final    **Note: Absolute NRBC parameter is now reported with Hemogram**    Neutrophils % 01/10/2024 64  43 - 78 % Final    Lymphocytes % 01/10/2024 22  13 - 44 % Final    Monocytes % 01/10/2024 9  4.0 - 12.0 % Final    Eosinophils % 01/10/2024 2  0.5 - 7.8 % Final    Basophils % 01/10/2024 1  0.0 - 2.0 % Final    Immature Granulocytes 01/10/2024 2  0.0 - 5.0 % Final    Neutrophils Absolute 01/10/2024 5.2  1.7 - 8.2 K/UL Final    Lymphocytes Absolute 01/10/2024 1.7  0.5 - 4.6 K/UL Final    Monocytes Absolute 01/10/2024 0.7  0.1 - 1.3 K/UL Final    Eosinophils Absolute 01/10/2024 0.2  0.0 - 0.8 K/UL Final    Basophils Absolute 01/10/2024 0.1  0.0 - 0.2 K/UL Final    Absolute Immature Granulocyte 01/10/2024 0.1  0.0 - 0.5 K/UL Final    Differential Type 01/10/2024 AUTOMATED    Final           Treatment Summary has been discussed and given to patient: N/A        -------------------------------------------------------------------------------------------------------------------  Please call our office at [phone] if

## 2024-01-10 NOTE — PROGRESS NOTES
[facility]: Office Visit Established Patient    Reason for follow up:    Thrombocytopenia    Overview: (copied from prior)  Mr. Velazquez was seen in the office initially on 2/7/2023 for evaluation of thrombocytopenia (Platelets 122 and 112 in 10/22 and 1/23 respectively) noticed on recent labs ordered by his PCP.  In last few months, he has noticed a tendency to bruise easily with subjectively minor trauma.  He denies hematemesis, bloody/black stools, nosebleeds/gum bleeds, unexplained fever, drenching night sweats, swollen glands in the body, new/unusual sites of bone pain, chest pain or palpitations.  He was evaluated by cardiology about 2 years ago for anginal symptoms and reportedly work-up for coronary artery disease was negative.  He has had episodes of hematuria in the past which were deemed related to BPH.  He follows with urology at [facility], last visit was in October 2022.  He underwent stress echocardiogram on 3/31/23 which showed severe anteroseptal ischemia. Left heart cath is being planned.     Interval history:  Patient returns for follow-up and review of labs.  He underwent coronary angiography in July 2023 which showed normal coronaries.  He continues to experience episodes of irregular heartbeat which are felt secondary to premature atrial contractions.  He has been started on metoprolol succinate and continues to be followed by cardiology.  He denies fever, night sweats, cough, spontaneous bleeding or bruising, hemoptysis, hematuria.  Denies intercurrent history of VTE or bleeding.  He is accompanied by his wife during today's visit.    Review of Systems:  14 point ROS was negative except as per HPI      ECOG PERFORMANCE STATUS - 0-Fully active, able to carry on all pre-disease performance without restriction.    Pain - /10. None/Minimal pain - not affecting QOL     Fatigue -   Distress -        No data to display                     Reviewed and updated this visit by

## 2024-04-03 ENCOUNTER — OFFICE VISIT (OUTPATIENT)
Age: 74
End: 2024-04-03
Payer: MEDICARE

## 2024-04-03 VITALS
DIASTOLIC BLOOD PRESSURE: 66 MMHG | HEART RATE: 60 BPM | SYSTOLIC BLOOD PRESSURE: 118 MMHG | BODY MASS INDEX: 32.41 KG/M2 | WEIGHT: 239 LBS

## 2024-04-03 DIAGNOSIS — I49.1 PAC (PREMATURE ATRIAL CONTRACTION): Primary | ICD-10-CM

## 2024-04-03 DIAGNOSIS — I49.9 IRREGULAR HEART BEAT: ICD-10-CM

## 2024-04-03 DIAGNOSIS — E78.2 MIXED HYPERLIPIDEMIA: ICD-10-CM

## 2024-04-03 PROCEDURE — 99214 OFFICE O/P EST MOD 30 MIN: CPT | Performed by: INTERNAL MEDICINE

## 2024-04-03 PROCEDURE — G8417 CALC BMI ABV UP PARAM F/U: HCPCS | Performed by: INTERNAL MEDICINE

## 2024-04-03 PROCEDURE — G8427 DOCREV CUR MEDS BY ELIG CLIN: HCPCS | Performed by: INTERNAL MEDICINE

## 2024-04-03 PROCEDURE — 3017F COLORECTAL CA SCREEN DOC REV: CPT | Performed by: INTERNAL MEDICINE

## 2024-04-03 PROCEDURE — 1123F ACP DISCUSS/DSCN MKR DOCD: CPT | Performed by: INTERNAL MEDICINE

## 2024-04-03 PROCEDURE — 1036F TOBACCO NON-USER: CPT | Performed by: INTERNAL MEDICINE

## 2024-04-03 RX ORDER — ROSUVASTATIN CALCIUM 10 MG/1
10 TABLET, COATED ORAL NIGHTLY
Qty: 90 TABLET | Refills: 3 | Status: SHIPPED | OUTPATIENT
Start: 2024-04-03

## 2024-04-03 RX ORDER — METOPROLOL SUCCINATE 25 MG/1
25 TABLET, EXTENDED RELEASE ORAL DAILY
Qty: 90 TABLET | Refills: 3 | Status: SHIPPED | OUTPATIENT
Start: 2024-04-03

## 2024-04-03 ASSESSMENT — ENCOUNTER SYMPTOMS
HEMOPTYSIS: 0
HEMATOCHEZIA: 0
STRIDOR: 0
HOARSE VOICE: 0
WHEEZING: 0
ABDOMINAL PAIN: 0
DOUBLE VISION: 0
EYE REDNESS: 0
HEMATEMESIS: 0

## 2024-04-03 NOTE — PROGRESS NOTES
9.78.    Contrast used: Definity.    ECG: The ECG was borderline for diagnosis.    Stress Test: A Kris protocol stress test was performed. Blood pressure demonstrated a normal response to stress. He achieved 10.1 Mets.    Signed by: Vance Walker MD on 3/31/2023  8:19 AM     Lab Results   Component Value Date    HGB 15.2 01/10/2024      Lab Results   Component Value Date     (L) 01/10/2024        ASSESSMENT and PLAN    Precordial chest pain:  -Reassured him of the findings on coronary angiography from July 2023 with normal coronaries.    Thrombocytopenia, unspecified  Follows with hematology-stable and recovered.  No significant easy bruising or bleeding.    Irregular heart beat  Continue metoprolol succinate.  Secondary to PACs    PAC (premature atrial contraction)  Continue metoprolol succinate.  Over 10% of all beats were PACs on Holter monitor    Mixed hyperlipidemia  Continue rosuvastatin therapy.         Overall Impression  -He has done very well from a cardiac perspective since he last met with us.  Coronary arteries were normal on coronary angiography from July 2023.  PACs are much better controlled on metoprolol succinate and his lipids are well controlled with low-dose rosuvastatin.  He has a rash on the inside of his left thigh-appears to be fungal-advised him to  over-the-counter clotrimazole and apply this-1% and that should help him.  He will see dermatology next month.    We will see him in follow-up in 6 months time    Return in about 6 months (around 10/3/2024) for pACs, palpitations, hld.     Thank you for allowing us to participate in the care of your patient.  If you have any further questions, please do not hesitate to contact us.  Sincerely,        Vance Walker MD   4/3/2024

## 2024-07-08 ENCOUNTER — TELEPHONE (OUTPATIENT)
Dept: ONCOLOGY | Age: 74
End: 2024-07-08

## 2024-07-08 RX ORDER — FINASTERIDE 5 MG/1
5 TABLET, FILM COATED ORAL DAILY
Qty: 30 TABLET | Refills: 3 | Status: CANCELLED | OUTPATIENT
Start: 2024-07-08

## 2024-07-08 RX ORDER — FINASTERIDE 5 MG/1
5 TABLET, FILM COATED ORAL DAILY
Qty: 90 TABLET | Refills: 3 | Status: SHIPPED | OUTPATIENT
Start: 2024-07-08

## 2024-07-08 NOTE — TELEPHONE ENCOUNTER
Physician provider: Dr. Park  Reason for today's call (Please detail here patients chief complaint): appt time  Last office visit:n/a  Preferred pharmacy (If refill request): n/a  Calls to office within the last 48 hours?:No    Patient notified that their information will be routed to the [facility] clinical triage team for review. Patient is advised that they will receive a phone call from the triage department. If symptom related and symptoms worsen before receiving a call back, the patient has been advised to proceed to the nearest ED.          Pt would like to discuss 7/26 appt. Pt stated 2:20 is late in the day because they live an hour away

## 2024-07-08 NOTE — TELEPHONE ENCOUNTER
The requested medication is prescribed by the pt's urologist- Finasteride  He will need to contact their office for refills    [facility]

## 2024-07-08 NOTE — TELEPHONE ENCOUNTER
Spoke with Pt's wife who says they have ongoing issues with getting urologist to schedule appts or refill meds. She said Dr. Dunn is aware and said he will refill the Finasteride (90, 5 mg/daily). Pt is out of pills now. Pharmacy is [facility] in [place].

## 2024-07-17 RX ORDER — ALFUZOSIN HYDROCHLORIDE 10 MG/1
TABLET, EXTENDED RELEASE ORAL
Qty: 30 TABLET | OUTPATIENT
Start: 2024-07-17

## 2024-07-17 NOTE — TELEPHONE ENCOUNTER
This medication is managed by his urologist  He will need to contact their office for refills on the alfuzosin

## 2024-07-17 NOTE — TELEPHONE ENCOUNTER
Medication Refill Request      Name of Medication : alfuzosin (UROXATRAL)       Strength of Medication: 10 MG extended release tablet       Directions: TAKE 1 TABLET BY MOUTH EVERY DAY       30 day or 90 day supply: ??      Preferred Pharmacy: [facility] in [place]    Additional Information For Provider:

## 2024-07-26 ENCOUNTER — OFFICE VISIT (OUTPATIENT)
Dept: ONCOLOGY | Age: 74
End: 2024-07-26
Payer: MEDICARE

## 2024-07-26 ENCOUNTER — HOSPITAL ENCOUNTER (OUTPATIENT)
Dept: LAB | Age: 74
End: 2024-07-26
Payer: MEDICARE

## 2024-07-26 VITALS
HEIGHT: 72 IN | TEMPERATURE: 97.4 F | DIASTOLIC BLOOD PRESSURE: 83 MMHG | BODY MASS INDEX: 32.22 KG/M2 | SYSTOLIC BLOOD PRESSURE: 124 MMHG | HEART RATE: 56 BPM | RESPIRATION RATE: 18 BRPM | OXYGEN SATURATION: 97 % | WEIGHT: 237.9 LBS

## 2024-07-26 DIAGNOSIS — D69.6 THROMBOCYTOPENIA (HCC): ICD-10-CM

## 2024-07-26 DIAGNOSIS — D69.6 THROMBOCYTOPENIA (HCC): Primary | ICD-10-CM

## 2024-07-26 LAB
ALBUMIN SERPL-MCNC: 3.8 G/DL (ref 3.2–4.6)
ALBUMIN/GLOB SERPL: 1.4 (ref 1–1.9)
ALP SERPL-CCNC: 67 U/L (ref 40–129)
ALT SERPL-CCNC: 43 U/L (ref 12–65)
ANION GAP SERPL CALC-SCNC: 8 MMOL/L (ref 9–18)
AST SERPL-CCNC: 22 U/L (ref 15–37)
BASOPHILS # BLD: 0.1 K/UL (ref 0–0.2)
BASOPHILS NFR BLD: 1 % (ref 0–2)
BILIRUB SERPL-MCNC: 0.7 MG/DL (ref 0–1.2)
BUN SERPL-MCNC: 18 MG/DL (ref 8–23)
CALCIUM SERPL-MCNC: 10 MG/DL (ref 8.8–10.2)
CHLORIDE SERPL-SCNC: 106 MMOL/L (ref 98–107)
CO2 SERPL-SCNC: 27 MMOL/L (ref 20–28)
CREAT SERPL-MCNC: 1.12 MG/DL (ref 0.8–1.3)
DIFFERENTIAL METHOD BLD: ABNORMAL
EOSINOPHIL # BLD: 0.2 K/UL (ref 0–0.8)
EOSINOPHIL NFR BLD: 2 % (ref 0.5–7.8)
ERYTHROCYTE [DISTWIDTH] IN BLOOD BY AUTOMATED COUNT: 13.5 % (ref 11.9–14.6)
GLOBULIN SER CALC-MCNC: 2.8 G/DL (ref 2.3–3.5)
GLUCOSE SERPL-MCNC: 110 MG/DL (ref 70–99)
HCT VFR BLD AUTO: 44.9 % (ref 41.1–50.3)
HGB BLD-MCNC: 15 G/DL (ref 13.6–17.2)
IMM GRANULOCYTES # BLD AUTO: 0.1 K/UL (ref 0–0.5)
IMM GRANULOCYTES NFR BLD AUTO: 1 % (ref 0–5)
LYMPHOCYTES # BLD: 1.9 K/UL (ref 0.5–4.6)
LYMPHOCYTES NFR BLD: 21 % (ref 13–44)
MCH RBC QN AUTO: 28.2 PG (ref 26.1–32.9)
MCHC RBC AUTO-ENTMCNC: 33.4 G/DL (ref 31.4–35)
MCV RBC AUTO: 84.6 FL (ref 82–102)
MONOCYTES # BLD: 1 K/UL (ref 0.1–1.3)
MONOCYTES NFR BLD: 11 % (ref 4–12)
NEUTS SEG # BLD: 5.8 K/UL (ref 1.7–8.2)
NEUTS SEG NFR BLD: 64 % (ref 43–78)
NRBC # BLD: 0 K/UL (ref 0–0.2)
PLATELET # BLD AUTO: 115 K/UL (ref 150–450)
PMV BLD AUTO: 9.6 FL (ref 9.4–12.3)
POTASSIUM SERPL-SCNC: 4.6 MMOL/L (ref 3.5–5.1)
PROT SERPL-MCNC: 6.6 G/DL (ref 6.3–8.2)
RBC # BLD AUTO: 5.31 M/UL (ref 4.23–5.6)
SODIUM SERPL-SCNC: 141 MMOL/L (ref 136–145)
WBC # BLD AUTO: 9 K/UL (ref 4.3–11.1)

## 2024-07-26 PROCEDURE — 85025 COMPLETE CBC W/AUTO DIFF WBC: CPT

## 2024-07-26 PROCEDURE — 36415 COLL VENOUS BLD VENIPUNCTURE: CPT

## 2024-07-26 PROCEDURE — 1123F ACP DISCUSS/DSCN MKR DOCD: CPT | Performed by: NURSE PRACTITIONER

## 2024-07-26 PROCEDURE — 99213 OFFICE O/P EST LOW 20 MIN: CPT | Performed by: NURSE PRACTITIONER

## 2024-07-26 PROCEDURE — 3017F COLORECTAL CA SCREEN DOC REV: CPT | Performed by: NURSE PRACTITIONER

## 2024-07-26 PROCEDURE — G8427 DOCREV CUR MEDS BY ELIG CLIN: HCPCS | Performed by: NURSE PRACTITIONER

## 2024-07-26 PROCEDURE — 80053 COMPREHEN METABOLIC PANEL: CPT

## 2024-07-26 PROCEDURE — 1036F TOBACCO NON-USER: CPT | Performed by: NURSE PRACTITIONER

## 2024-07-26 PROCEDURE — G8417 CALC BMI ABV UP PARAM F/U: HCPCS | Performed by: NURSE PRACTITIONER

## 2024-07-26 NOTE — PATIENT INSTRUCTIONS
Hospital Outpatient Visit on 07/26/2024   Component Date Value Ref Range Status    WBC 07/26/2024 9.0  4.3 - 11.1 K/uL Final    RBC 07/26/2024 5.31  4.23 - 5.6 M/uL Final    Hemoglobin 07/26/2024 15.0  13.6 - 17.2 g/dL Final    Hematocrit 07/26/2024 44.9  41.1 - 50.3 % Final    MCV 07/26/2024 84.6  82.0 - 102.0 FL Final    MCH 07/26/2024 28.2  26.1 - 32.9 PG Final    MCHC 07/26/2024 33.4  31.4 - 35.0 g/dL Final    RDW 07/26/2024 13.5  11.9 - 14.6 % Final    Platelets 07/26/2024 115 (L)  150 - 450 K/uL Final    MPV 07/26/2024 9.6  9.4 - 12.3 FL Final    nRBC 07/26/2024 0.00  0.0 - 0.2 K/uL Final    **Note: Absolute NRBC parameter is now reported with Hemogram**    Neutrophils % 07/26/2024 64  43 - 78 % Final    Lymphocytes % 07/26/2024 21  13 - 44 % Final    Monocytes % 07/26/2024 11  4.0 - 12.0 % Final    Eosinophils % 07/26/2024 2  0.5 - 7.8 % Final    Basophils % 07/26/2024 1  0.0 - 2.0 % Final    Immature Granulocytes % 07/26/2024 1  0.0 - 5.0 % Final    Neutrophils Absolute 07/26/2024 5.8  1.7 - 8.2 K/UL Final    Lymphocytes Absolute 07/26/2024 1.9  0.5 - 4.6 K/UL Final    Monocytes Absolute 07/26/2024 1.0  0.1 - 1.3 K/UL Final    Eosinophils Absolute 07/26/2024 0.2  0.0 - 0.8 K/UL Final    Basophils Absolute 07/26/2024 0.1  0.0 - 0.2 K/UL Final    Immature Granulocytes Absolute 07/26/2024 0.1  0.0 - 0.5 K/UL Final    Differential Type 07/26/2024 AUTOMATED    Final    Sodium 07/26/2024 141  136 - 145 mmol/L Final    Potassium 07/26/2024 4.6  3.5 - 5.1 mmol/L Final    Chloride 07/26/2024 106  98 - 107 mmol/L Final    CO2 07/26/2024 27  20 - 28 mmol/L Final    Anion Gap 07/26/2024 8 (L)  9 - 18 mmol/L Final    Glucose 07/26/2024 110 (H)  70 - 99 mg/dL Final    Comment: <70 mg/dL Consistent with, but not fully diagnostic of hypoglycemia.  100 - 125 mg/dL Impaired fasting glucose/consistent with pre-diabetes mellitus.  > 126 mg/dl Fasting glucose consistent with overt diabetes mellitus      BUN 07/26/2024 18  8 - 23

## 2024-07-26 NOTE — PROGRESS NOTES
Earl Atwood M.D., Ph.D. on 4/12/2023                                 Interpretation                       [email or web address] Flow Cytometry Analysis     No diagnostic immunophenotypic abnormalities detected     See full report in Epic as errors can occur when results are imbedded into   this report.                                        [facility] - CYTOGENETICS                                                                                                                                       Status:  Signed Out    Earl Atwood M.D., Ph.D. on 4/14/2023                                 Interpretation                       [email or web address] Chromosome Analysis     Karyotype:  46,XY[20]       Imaging:  MRI ABDOMEN W WO CONTRAST    Result Date: 2/24/2023  Unremarkable MRI abdomen. No evidence of fatty liver infiltration or splenic lesion. Simple cysts involving the kidneys for which no further follow-up recommended. No enlarged lymph nodes. Spleen is normal in size.         Problems:  This 72 years old gentleman presents for evaluation of mild thrombocytopenia.  With the exception of some easy bruisability,he has been fairly asymptomatic.  He underwent stress echocardiogram on 3/31/23 which showed severe anteroseptal ischemia. Left heart cath is being planned.     PLAN:  I have reviewed the results of most recent labs and imaging with the patient including MRI abdomen which showed no abnormalities of liver or spleen.    Bone marrow biopsy demonstrated no evidence of bone marrow disorder.  I suspect ITP to be the etiology for thrombocytopenia.  Follow antiphospholipid antibody panel with next labs.  Discuss role of steroids and other immunosuppressive agents with or without platelet transfusion for severe or symptomatic thrombocytopenia.    1/10/2024: Patient is clinically stable and platelet count is 113 which is well within his range of radiation since 2019.  He has no relatable symptoms.  No intervention is felt warranted at

## 2024-09-30 RX ORDER — SOLIFENACIN SUCCINATE 5 MG/1
5 TABLET, FILM COATED ORAL DAILY
Qty: 90 TABLET | Refills: 3 | Status: SHIPPED | OUTPATIENT
Start: 2024-09-30

## 2024-11-01 ENCOUNTER — OFFICE VISIT (OUTPATIENT)
Dept: INTERNAL MEDICINE CLINIC | Facility: CLINIC | Age: 74
End: 2024-11-01

## 2024-11-01 VITALS
SYSTOLIC BLOOD PRESSURE: 109 MMHG | BODY MASS INDEX: 31.97 KG/M2 | TEMPERATURE: 97.2 F | RESPIRATION RATE: 16 BRPM | DIASTOLIC BLOOD PRESSURE: 78 MMHG | WEIGHT: 236 LBS | HEART RATE: 66 BPM | HEIGHT: 72 IN

## 2024-11-01 DIAGNOSIS — Z00.00 MEDICARE ANNUAL WELLNESS VISIT, SUBSEQUENT: ICD-10-CM

## 2024-11-01 DIAGNOSIS — L82.1 OTHER SEBORRHEIC KERATOSIS: Primary | ICD-10-CM

## 2024-11-01 DIAGNOSIS — R53.82 CHRONIC FATIGUE: ICD-10-CM

## 2024-11-01 DIAGNOSIS — R39.16 BENIGN PROSTATIC HYPERPLASIA (BPH) WITH STRAINING ON URINATION: ICD-10-CM

## 2024-11-01 DIAGNOSIS — E78.2 MIXED HYPERLIPIDEMIA: ICD-10-CM

## 2024-11-01 DIAGNOSIS — N40.1 BENIGN PROSTATIC HYPERPLASIA (BPH) WITH STRAINING ON URINATION: ICD-10-CM

## 2024-11-01 DIAGNOSIS — D69.6 THROMBOCYTOPENIA, UNSPECIFIED (HCC): ICD-10-CM

## 2024-11-01 DIAGNOSIS — I49.49 PREMATURE BEATS: ICD-10-CM

## 2024-11-01 DIAGNOSIS — R91.1 LUNG NODULE: ICD-10-CM

## 2024-11-01 LAB
ALBUMIN SERPL-MCNC: 4 G/DL (ref 3.2–4.6)
ALBUMIN/GLOB SERPL: 1.4 (ref 1–1.9)
ALP SERPL-CCNC: 71 U/L (ref 40–129)
ALT SERPL-CCNC: 49 U/L (ref 8–55)
ANION GAP SERPL CALC-SCNC: 9 MMOL/L (ref 7–16)
APPEARANCE UR: CLEAR
AST SERPL-CCNC: 27 U/L (ref 15–37)
BACTERIA URNS QL MICRO: 0 /HPF
BASOPHILS # BLD: 0.1 K/UL (ref 0–0.2)
BASOPHILS NFR BLD: 1 % (ref 0–2)
BILIRUB SERPL-MCNC: 0.6 MG/DL (ref 0–1.2)
BILIRUB UR QL: NEGATIVE
BUN SERPL-MCNC: 23 MG/DL (ref 8–23)
CALCIUM SERPL-MCNC: 10.5 MG/DL (ref 8.8–10.2)
CHLORIDE SERPL-SCNC: 106 MMOL/L (ref 98–107)
CHOLEST SERPL-MCNC: 123 MG/DL (ref 0–200)
CO2 SERPL-SCNC: 25 MMOL/L (ref 20–29)
COLOR UR: ABNORMAL
CREAT SERPL-MCNC: 1.17 MG/DL (ref 0.8–1.3)
DIFFERENTIAL METHOD BLD: ABNORMAL
EOSINOPHIL # BLD: 0.2 K/UL (ref 0–0.8)
EOSINOPHIL NFR BLD: 2 % (ref 0.5–7.8)
EPI CELLS #/AREA URNS HPF: ABNORMAL /HPF
ERYTHROCYTE [DISTWIDTH] IN BLOOD BY AUTOMATED COUNT: 13.2 % (ref 11.9–14.6)
GLOBULIN SER CALC-MCNC: 2.9 G/DL (ref 2.3–3.5)
GLUCOSE SERPL-MCNC: 106 MG/DL (ref 70–99)
GLUCOSE UR STRIP.AUTO-MCNC: NEGATIVE MG/DL
HCT VFR BLD AUTO: 45 % (ref 41.1–50.3)
HDLC SERPL-MCNC: 35 MG/DL (ref 40–60)
HDLC SERPL: 3.5 (ref 0–5)
HGB BLD-MCNC: 15.5 G/DL (ref 13.6–17.2)
HGB UR QL STRIP: ABNORMAL
IMM GRANULOCYTES # BLD AUTO: 0 K/UL (ref 0–0.5)
IMM GRANULOCYTES NFR BLD AUTO: 1 % (ref 0–5)
KETONES UR QL STRIP.AUTO: NEGATIVE MG/DL
LDLC SERPL CALC-MCNC: 43 MG/DL (ref 0–100)
LEUKOCYTE ESTERASE UR QL STRIP.AUTO: NEGATIVE
LYMPHOCYTES # BLD: 1.7 K/UL (ref 0.5–4.6)
LYMPHOCYTES NFR BLD: 19 % (ref 13–44)
MCH RBC QN AUTO: 28.5 PG (ref 26.1–32.9)
MCHC RBC AUTO-ENTMCNC: 34.4 G/DL (ref 31.4–35)
MCV RBC AUTO: 82.7 FL (ref 82–102)
MONOCYTES # BLD: 0.9 K/UL (ref 0.1–1.3)
MONOCYTES NFR BLD: 11 % (ref 4–12)
NEUTS SEG # BLD: 5.9 K/UL (ref 1.7–8.2)
NEUTS SEG NFR BLD: 66 % (ref 43–78)
NITRITE UR QL STRIP.AUTO: NEGATIVE
NRBC # BLD: 0 K/UL (ref 0–0.2)
OTHER OBSERVATIONS: ABNORMAL
PH UR STRIP: 5.5 (ref 5–9)
PLATELET # BLD AUTO: 119 K/UL (ref 150–450)
PMV BLD AUTO: 10.1 FL (ref 9.4–12.3)
POTASSIUM SERPL-SCNC: 4.3 MMOL/L (ref 3.5–5.1)
PROT SERPL-MCNC: 6.8 G/DL (ref 6.3–8.2)
PROT UR STRIP-MCNC: NEGATIVE MG/DL
PSA SERPL-MCNC: 3.1 NG/ML (ref 0–4)
RBC # BLD AUTO: 5.44 M/UL (ref 4.23–5.6)
SODIUM SERPL-SCNC: 141 MMOL/L (ref 136–145)
SP GR UR REFRACTOMETRY: 1.01 (ref 1–1.02)
TRIGL SERPL-MCNC: 228 MG/DL (ref 0–150)
UROBILINOGEN UR QL STRIP.AUTO: 0.2 EU/DL (ref 0.2–1)
VLDLC SERPL CALC-MCNC: 46 MG/DL (ref 6–23)
WBC # BLD AUTO: 8.8 K/UL (ref 4.3–11.1)

## 2024-11-01 SDOH — ECONOMIC STABILITY: FOOD INSECURITY: WITHIN THE PAST 12 MONTHS, YOU WORRIED THAT YOUR FOOD WOULD RUN OUT BEFORE YOU GOT MONEY TO BUY MORE.: NEVER TRUE

## 2024-11-01 SDOH — ECONOMIC STABILITY: INCOME INSECURITY: HOW HARD IS IT FOR YOU TO PAY FOR THE VERY BASICS LIKE FOOD, HOUSING, MEDICAL CARE, AND HEATING?: NOT HARD AT ALL

## 2024-11-01 SDOH — ECONOMIC STABILITY: FOOD INSECURITY: WITHIN THE PAST 12 MONTHS, THE FOOD YOU BOUGHT JUST DIDN'T LAST AND YOU DIDN'T HAVE MONEY TO GET MORE.: NEVER TRUE

## 2024-11-01 ASSESSMENT — PATIENT HEALTH QUESTIONNAIRE - PHQ9
SUM OF ALL RESPONSES TO PHQ QUESTIONS 1-9: 0
2. FEELING DOWN, DEPRESSED OR HOPELESS: NOT AT ALL
SUM OF ALL RESPONSES TO PHQ9 QUESTIONS 1 & 2: 0
1. LITTLE INTEREST OR PLEASURE IN DOING THINGS: NOT AT ALL
SUM OF ALL RESPONSES TO PHQ QUESTIONS 1-9: 0

## 2024-11-01 NOTE — PROGRESS NOTES
ordered.    Positive Risk Factor Screenings with Interventions:    Fall Risk:  Do you feel unsteady or are you worried about falling? : (!) yes  2 or more falls in past year?: no  Fall with injury in past year?: no     Interventions:    Reviewed medications, home hazards, visual acuity, and co-morbidities that can increase risk for falls    Cognitive:      Words recalled: 2 Words Recalled     Total Score Interpretation: Abnormal Mini-Cog  Interventions:  Patient advised to follow-up in this office for further evaluation and treatment              Abnormal BMI (obese):  Body mass index is 32.01 kg/m². (!) Abnormal  Interventions:  Patient advised to follow-up in this office for further evaluation and treatment                           Objective   Vitals:    11/01/24 1043   BP: 109/78   Site: Left Upper Arm   Position: Sitting   Cuff Size: Large Adult   Pulse: 66   Resp: 16   Temp: 97.2 °F (36.2 °C)   TempSrc: Temporal   Weight: 107 kg (236 lb)   Height: 1.829 m (6')      Body mass index is 32.01 kg/m².                  No Known Allergies  Prior to Visit Medications    Medication Sig Taking? Authorizing Provider   solifenacin (VESICARE) 5 MG tablet TAKE 1 TABLET BY MOUTH EVERY DAY Yes Kashif Dunn MD   finasteride (PROSCAR) 5 MG tablet Take 1 tablet by mouth daily Yes Kashif Dunn MD   metoprolol succinate (TOPROL XL) 25 MG extended release tablet Take 1 tablet by mouth daily Yes Vance Neil MD   rosuvastatin (CRESTOR) 10 MG tablet Take 1 tablet by mouth nightly Yes Vance Neil MD   hydrocortisone (ANUSOL-HC) 2.5 % CREA rectal cream Place rectally nightly Yes Kashif Dunn MD   nitroGLYCERIN (NITROSTAT) 0.4 MG SL tablet Place 1 tablet under the tongue every 5 minutes as needed for Chest pain Yes Vance Neil MD   alfuzosin (UROXATRAL) 10 MG extended release tablet TAKE 1 TABLET BY MOUTH EVERY DAY Yes Automatic Reconciliation, Tom Pink (Including

## 2024-11-04 ENCOUNTER — TELEPHONE (OUTPATIENT)
Dept: INTERNAL MEDICINE CLINIC | Facility: CLINIC | Age: 74
End: 2024-11-04

## 2024-11-04 NOTE — TELEPHONE ENCOUNTER
This has been fully explained to the patient, who indicates understanding.  Per pt he will contact the urologist to get an appt.

## 2024-11-04 NOTE — TELEPHONE ENCOUNTER
----- Message from Dr. Kashif Dunn MD sent at 11/3/2024  7:42 AM EST -----  Call pt, labs are stable, platelet count is  still low, he has blood in his urine and needs a follow up with his urologist, send in referral if needed for microscopic hematuria cms

## 2024-11-18 ENCOUNTER — HOSPITAL ENCOUNTER (OUTPATIENT)
Dept: CT IMAGING | Age: 74
Discharge: HOME | End: 2024-11-21
Attending: INTERNAL MEDICINE
Payer: MEDICARE

## 2024-11-18 DIAGNOSIS — R91.1 LUNG NODULE: ICD-10-CM

## 2024-11-18 PROCEDURE — 71250 CT THORAX DX C-: CPT

## 2024-11-21 ENCOUNTER — OFFICE VISIT (OUTPATIENT)
Age: 74
End: 2024-11-21

## 2024-11-21 VITALS
HEIGHT: 72 IN | SYSTOLIC BLOOD PRESSURE: 122 MMHG | HEART RATE: 65 BPM | DIASTOLIC BLOOD PRESSURE: 68 MMHG | WEIGHT: 241 LBS | BODY MASS INDEX: 32.64 KG/M2

## 2024-11-21 DIAGNOSIS — E78.2 MIXED HYPERLIPIDEMIA: ICD-10-CM

## 2024-11-21 DIAGNOSIS — I49.1 PAC (PREMATURE ATRIAL CONTRACTION): Primary | ICD-10-CM

## 2024-11-21 DIAGNOSIS — I49.9 IRREGULAR HEART BEAT: ICD-10-CM

## 2024-11-21 ASSESSMENT — ENCOUNTER SYMPTOMS
HEMOPTYSIS: 0
HOARSE VOICE: 0
ABDOMINAL PAIN: 0
STRIDOR: 0
WHEEZING: 0
HEMATEMESIS: 0
HEMATOCHEZIA: 0
DOUBLE VISION: 0
EYE REDNESS: 0

## 2024-11-21 NOTE — PROGRESS NOTES
hypertrophy. Normal wall motion. Normal diastolic function. Average E/e' ratio is 9.78.    Contrast used: Definity.    ECG: The ECG was borderline for diagnosis.    Stress Test: A Kris protocol stress test was performed. Blood pressure demonstrated a normal response to stress. He achieved 10.1 Mets.    Signed by: Vance Walker MD on 3/31/2023  8:19 AM     Lab Results   Component Value Date    HGB 15.5 11/01/2024      Lab Results   Component Value Date     (L) 11/01/2024        ASSESSMENT and PLAN    Precordial chest pain:  -Reassured him of the findings on coronary angiography from July 2023 with normal coronaries.    Thrombocytopenia, unspecified  Follows with hematology-stable and recovered.  No significant easy bruising or bleeding.    Irregular heart beat  Continue metoprolol succinate.  Secondary to PACs    PAC (premature atrial contraction)  Continue metoprolol succinate.  Over 10% of all beats were PACs on Holter monitor    Mixed hyperlipidemia  Continue rosuvastatin therapy.         Overall Impression  -He has done very well from a cardiac perspective since he last met with us.  No further chest pain.  Coronary arteries were normal on coronary angiography from July 2023.  PACs are much better controlled on metoprolol succinate and his lipids are well controlled with low-dose rosuvastatin.  The main reason to stay on top of his PACs and see him at least every 6 months is to rule out the chance of him getting A-fib.  We will see him in follow-up in 6 months time    Return in about 6 months (around 5/21/2025) for PACs, hld.     Thank you for allowing us to participate in the care of your patient.  If you have any further questions, please do not hesitate to contact us.  Sincerely,        Vance Walker MD   11/21/2024

## 2024-12-27 ENCOUNTER — PATIENT MESSAGE (OUTPATIENT)
Dept: ONCOLOGY | Age: 74
End: 2024-12-27

## 2025-01-30 ENCOUNTER — OFFICE VISIT (OUTPATIENT)
Dept: ONCOLOGY | Age: 75
End: 2025-01-30
Payer: MEDICARE

## 2025-01-30 ENCOUNTER — HOSPITAL ENCOUNTER (OUTPATIENT)
Dept: LAB | Age: 75
Discharge: HOME | End: 2025-01-30
Payer: MEDICARE

## 2025-01-30 VITALS
HEIGHT: 72 IN | DIASTOLIC BLOOD PRESSURE: 75 MMHG | BODY MASS INDEX: 32.67 KG/M2 | SYSTOLIC BLOOD PRESSURE: 141 MMHG | RESPIRATION RATE: 16 BRPM | HEART RATE: 58 BPM | WEIGHT: 241.2 LBS | OXYGEN SATURATION: 95 % | TEMPERATURE: 97.5 F

## 2025-01-30 DIAGNOSIS — D69.6 THROMBOCYTOPENIA (HCC): Primary | ICD-10-CM

## 2025-01-30 DIAGNOSIS — D69.6 THROMBOCYTOPENIA (HCC): ICD-10-CM

## 2025-01-30 LAB
ALBUMIN SERPL-MCNC: 3.6 G/DL (ref 3.2–4.6)
ALBUMIN/GLOB SERPL: 1.2 (ref 1–1.9)
ALP SERPL-CCNC: 66 U/L (ref 40–129)
ALT SERPL-CCNC: 31 U/L (ref 8–55)
ANION GAP SERPL CALC-SCNC: 12 MMOL/L (ref 7–16)
AST SERPL-CCNC: 19 U/L (ref 15–37)
BASOPHILS # BLD: 0.06 K/UL (ref 0–0.2)
BASOPHILS NFR BLD: 0.8 % (ref 0–2)
BILIRUB SERPL-MCNC: 0.5 MG/DL (ref 0–1.2)
BUN SERPL-MCNC: 23 MG/DL (ref 8–23)
CALCIUM SERPL-MCNC: 9.9 MG/DL (ref 8.8–10.2)
CHLORIDE SERPL-SCNC: 106 MMOL/L (ref 98–107)
CO2 SERPL-SCNC: 22 MMOL/L (ref 20–29)
CREAT SERPL-MCNC: 1.34 MG/DL (ref 0.8–1.3)
DIFFERENTIAL METHOD BLD: ABNORMAL
EOSINOPHIL # BLD: 0.26 K/UL (ref 0–0.8)
EOSINOPHIL NFR BLD: 3.3 % (ref 0.5–7.8)
ERYTHROCYTE [DISTWIDTH] IN BLOOD BY AUTOMATED COUNT: 13.2 % (ref 11.9–14.6)
GLOBULIN SER CALC-MCNC: 3.1 G/DL (ref 2.3–3.5)
GLUCOSE SERPL-MCNC: 123 MG/DL (ref 70–99)
HCT VFR BLD AUTO: 44.8 % (ref 41.1–50.3)
HGB BLD-MCNC: 14.8 G/DL (ref 13.6–17.2)
IMM GRANULOCYTES # BLD AUTO: 0.04 K/UL (ref 0–0.5)
IMM GRANULOCYTES NFR BLD AUTO: 0.5 % (ref 0–5)
LYMPHOCYTES # BLD: 1.68 K/UL (ref 0.5–4.6)
LYMPHOCYTES NFR BLD: 21.5 % (ref 13–44)
MCH RBC QN AUTO: 27.4 PG (ref 26.1–32.9)
MCHC RBC AUTO-ENTMCNC: 33 G/DL (ref 31.4–35)
MCV RBC AUTO: 82.8 FL (ref 82–102)
MONOCYTES # BLD: 0.69 K/UL (ref 0.1–1.3)
MONOCYTES NFR BLD: 8.8 % (ref 4–12)
NEUTS SEG # BLD: 5.1 K/UL (ref 1.7–8.2)
NEUTS SEG NFR BLD: 65.1 % (ref 43–78)
NRBC # BLD: 0 K/UL (ref 0–0.2)
PLATELET # BLD AUTO: 115 K/UL (ref 150–450)
PMV BLD AUTO: 9.7 FL (ref 9.4–12.3)
POTASSIUM SERPL-SCNC: 4.1 MMOL/L (ref 3.5–5.1)
PROT SERPL-MCNC: 6.7 G/DL (ref 6.3–8.2)
RBC # BLD AUTO: 5.41 M/UL (ref 4.23–5.6)
SODIUM SERPL-SCNC: 140 MMOL/L (ref 136–145)
WBC # BLD AUTO: 7.8 K/UL (ref 4.3–11.1)

## 2025-01-30 PROCEDURE — G8427 DOCREV CUR MEDS BY ELIG CLIN: HCPCS | Performed by: INTERNAL MEDICINE

## 2025-01-30 PROCEDURE — 99213 OFFICE O/P EST LOW 20 MIN: CPT | Performed by: INTERNAL MEDICINE

## 2025-01-30 PROCEDURE — G8417 CALC BMI ABV UP PARAM F/U: HCPCS | Performed by: INTERNAL MEDICINE

## 2025-01-30 PROCEDURE — 80053 COMPREHEN METABOLIC PANEL: CPT

## 2025-01-30 PROCEDURE — 1126F AMNT PAIN NOTED NONE PRSNT: CPT | Performed by: INTERNAL MEDICINE

## 2025-01-30 PROCEDURE — 1159F MED LIST DOCD IN RCRD: CPT | Performed by: INTERNAL MEDICINE

## 2025-01-30 PROCEDURE — 1123F ACP DISCUSS/DSCN MKR DOCD: CPT | Performed by: INTERNAL MEDICINE

## 2025-01-30 PROCEDURE — 36415 COLL VENOUS BLD VENIPUNCTURE: CPT

## 2025-01-30 PROCEDURE — 85025 COMPLETE CBC W/AUTO DIFF WBC: CPT

## 2025-01-30 PROCEDURE — 3017F COLORECTAL CA SCREEN DOC REV: CPT | Performed by: INTERNAL MEDICINE

## 2025-01-30 PROCEDURE — 1036F TOBACCO NON-USER: CPT | Performed by: INTERNAL MEDICINE

## 2025-01-30 PROCEDURE — 1160F RVW MEDS BY RX/DR IN RCRD: CPT | Performed by: INTERNAL MEDICINE

## 2025-01-30 ASSESSMENT — PATIENT HEALTH QUESTIONNAIRE - PHQ9
SUM OF ALL RESPONSES TO PHQ QUESTIONS 1-9: 0
2. FEELING DOWN, DEPRESSED OR HOPELESS: NOT AT ALL
1. LITTLE INTEREST OR PLEASURE IN DOING THINGS: NOT AT ALL
SUM OF ALL RESPONSES TO PHQ9 QUESTIONS 1 & 2: 0
SUM OF ALL RESPONSES TO PHQ QUESTIONS 1-9: 0

## 2025-01-30 NOTE — PROGRESS NOTES
[facility] Hematology and Oncology: Office Visit Established Patient    Reason for follow up:    Thrombocytopenia    Overview: (copied from prior)  Mr. Velazquez was seen in the office initially on 2/7/2023 for evaluation of thrombocytopenia (Platelets 122 and 112 in 10/22 and 1/23 respectively) noticed on recent labs ordered by his PCP.  In last few months, he has noticed a tendency to bruise easily with subjectively minor trauma.  He denies hematemesis, bloody/black stools, nosebleeds/gum bleeds, unexplained fever, drenching night sweats, swollen glands in the body, new/unusual sites of bone pain, chest pain or palpitations.  He was evaluated by cardiology about 2 years ago for anginal symptoms and reportedly work-up for coronary artery disease was negative.  He has had episodes of hematuria in the past which were deemed related to BPH.  He follows with urology at [facility], last visit was in October 2022.  He underwent stress echocardiogram on 3/31/23 which showed severe anteroseptal ischemia. Left heart cath is being planned.     Interval history:  History of Present Illness  The patient is a 74-year-old male who presents for a follow-up of thrombocytopenia, presumably related to immune thrombocytopenic purpura (ITP), as previous workup including a bone marrow biopsy remains unremarkable.    He reports no recent hospitalizations, episodes of bleeding, bruising, night sweats, or lymphadenopathy. His last consultation was with JANEEN Barragan 6 months prior.    He has been experiencing pruritus on his scalp for the past 3 weeks, which he attributes to dry weather. The condition has improved since he started using a shampoo containing pine tar a week ago.        Review of Systems:  14 point ROS was negative except as per HPI      ECOG PERFORMANCE STATUS - 0-Fully active, able to carry on all pre-disease performance without restriction.    Pain - Pain Score:   0 - No pain (fatigue 0)/10. None/Minimal pain - not

## 2025-01-30 NOTE — PATIENT INSTRUCTIONS
Patient Information from Today's Visit    Diagnosis:   Follow up for low platelets/ITP      Follow Up Instructions:   Labs reviewed. They are very stable.   No intervention at this time based on your platelet count.     Follow up: 6 months w NP and 12 months with Dr Park     Treatment Summary has been discussed and given to patient: N/A      Current Labs:   Hospital Outpatient Visit on 01/30/2025   Component Date Value Ref Range Status    WBC 01/30/2025 7.8  4.3 - 11.1 K/uL Final    RBC 01/30/2025 5.41  4.23 - 5.6 M/uL Final    Hemoglobin 01/30/2025 14.8  13.6 - 17.2 g/dL Final    Hematocrit 01/30/2025 44.8  41.1 - 50.3 % Final    MCV 01/30/2025 82.8  82.0 - 102.0 FL Final    MCH 01/30/2025 27.4  26.1 - 32.9 PG Final    MCHC 01/30/2025 33.0  31.4 - 35.0 g/dL Final    RDW 01/30/2025 13.2  11.9 - 14.6 % Final    Platelets 01/30/2025 115 (L)  150 - 450 K/uL Final    MPV 01/30/2025 9.7  9.4 - 12.3 FL Final    nRBC 01/30/2025 0.00  0.0 - 0.2 K/uL Final    **Note: Absolute NRBC parameter is now reported with Hemogram**    Differential Type 01/30/2025 AUTOMATED    Final    Neutrophils % 01/30/2025 65.1  43.0 - 78.0 % Final    Lymphocytes % 01/30/2025 21.5  13.0 - 44.0 % Final    Monocytes % 01/30/2025 8.8  4.0 - 12.0 % Final    Eosinophils % 01/30/2025 3.3  0.5 - 7.8 % Final    Basophils % 01/30/2025 0.8  0.0 - 2.0 % Final    Immature Granulocytes % 01/30/2025 0.5  0.0 - 5.0 % Final    Neutrophils Absolute 01/30/2025 5.10  1.70 - 8.20 K/UL Final    Lymphocytes Absolute 01/30/2025 1.68  0.50 - 4.60 K/UL Final    Monocytes Absolute 01/30/2025 0.69  0.10 - 1.30 K/UL Final    Eosinophils Absolute 01/30/2025 0.26  0.00 - 0.80 K/UL Final    Basophils Absolute 01/30/2025 0.06  0.00 - 0.20 K/UL Final    Immature Granulocytes Absolute 01/30/2025 0.04  0.00 - 0.50 K/UL Final    Sodium 01/30/2025 140  136 - 145 mmol/L Final    Potassium 01/30/2025 4.1  3.5 - 5.1 mmol/L Final    Chloride 01/30/2025 106  98 - 107 mmol/L Final    CO2

## 2025-03-29 DIAGNOSIS — I49.1 PAC (PREMATURE ATRIAL CONTRACTION): ICD-10-CM

## 2025-03-31 RX ORDER — METOPROLOL SUCCINATE 25 MG/1
25 TABLET, EXTENDED RELEASE ORAL DAILY
Qty: 90 TABLET | Refills: 3 | Status: SHIPPED | OUTPATIENT
Start: 2025-03-31

## 2025-03-31 NOTE — TELEPHONE ENCOUNTER
Requested Prescriptions     Pending Prescriptions Disp Refills    metoprolol succinate (TOPROL XL) 25 MG extended release tablet [Pharmacy Med Name: METOPROLOL SUCC ER 25 MG TAB] 90 tablet 3     Sig: TAKE 1 TABLET BY MOUTH EVERY DAY        Verified rx. Last OV 11/21/24. Erx to pharm on file.

## 2025-04-06 DIAGNOSIS — E78.2 MIXED HYPERLIPIDEMIA: ICD-10-CM

## 2025-04-07 RX ORDER — ROSUVASTATIN CALCIUM 10 MG/1
10 TABLET, COATED ORAL NIGHTLY
Qty: 90 TABLET | Refills: 3 | Status: SHIPPED | OUTPATIENT
Start: 2025-04-07

## 2025-04-07 NOTE — TELEPHONE ENCOUNTER
Requested Prescriptions     Pending Prescriptions Disp Refills    rosuvastatin (CRESTOR) 10 MG tablet [Pharmacy Med Name: ROSUVASTATIN CALCIUM 10 MG TAB] 90 tablet 3     Sig: TAKE 1 TABLET BY MOUTH EVERY DAY AT NIGHT      Verified rx. Last OV 11/21/2024. Erx to pharm on file.

## 2025-05-27 ENCOUNTER — OFFICE VISIT (OUTPATIENT)
Age: 75
End: 2025-05-27
Payer: MEDICARE

## 2025-05-27 VITALS
DIASTOLIC BLOOD PRESSURE: 64 MMHG | HEIGHT: 72 IN | HEART RATE: 60 BPM | SYSTOLIC BLOOD PRESSURE: 128 MMHG | WEIGHT: 244 LBS | BODY MASS INDEX: 33.05 KG/M2

## 2025-05-27 DIAGNOSIS — I49.9 IRREGULAR HEART BEAT: ICD-10-CM

## 2025-05-27 DIAGNOSIS — I49.1 PAC (PREMATURE ATRIAL CONTRACTION): ICD-10-CM

## 2025-05-27 DIAGNOSIS — E78.2 MIXED HYPERLIPIDEMIA: ICD-10-CM

## 2025-05-27 DIAGNOSIS — R07.2 PRECORDIAL PAIN: Primary | ICD-10-CM

## 2025-05-27 PROCEDURE — 1159F MED LIST DOCD IN RCRD: CPT | Performed by: INTERNAL MEDICINE

## 2025-05-27 PROCEDURE — 99214 OFFICE O/P EST MOD 30 MIN: CPT | Performed by: INTERNAL MEDICINE

## 2025-05-27 PROCEDURE — 3017F COLORECTAL CA SCREEN DOC REV: CPT | Performed by: INTERNAL MEDICINE

## 2025-05-27 PROCEDURE — 1123F ACP DISCUSS/DSCN MKR DOCD: CPT | Performed by: INTERNAL MEDICINE

## 2025-05-27 PROCEDURE — 1160F RVW MEDS BY RX/DR IN RCRD: CPT | Performed by: INTERNAL MEDICINE

## 2025-05-27 PROCEDURE — G8417 CALC BMI ABV UP PARAM F/U: HCPCS | Performed by: INTERNAL MEDICINE

## 2025-05-27 PROCEDURE — 1126F AMNT PAIN NOTED NONE PRSNT: CPT | Performed by: INTERNAL MEDICINE

## 2025-05-27 PROCEDURE — 1036F TOBACCO NON-USER: CPT | Performed by: INTERNAL MEDICINE

## 2025-05-27 PROCEDURE — G8427 DOCREV CUR MEDS BY ELIG CLIN: HCPCS | Performed by: INTERNAL MEDICINE

## 2025-05-27 RX ORDER — NITROGLYCERIN 0.4 MG/1
0.4 TABLET SUBLINGUAL EVERY 5 MIN PRN
Qty: 25 TABLET | Refills: 1 | Status: SHIPPED | OUTPATIENT
Start: 2025-05-27

## 2025-05-27 RX ORDER — METOPROLOL SUCCINATE 25 MG/1
25 TABLET, EXTENDED RELEASE ORAL DAILY
Qty: 90 TABLET | Refills: 3 | Status: SHIPPED | OUTPATIENT
Start: 2025-05-27

## 2025-05-27 RX ORDER — ROSUVASTATIN CALCIUM 10 MG/1
10 TABLET, COATED ORAL NIGHTLY
Qty: 90 TABLET | Refills: 3 | Status: SHIPPED | OUTPATIENT
Start: 2025-05-27

## 2025-05-27 ASSESSMENT — ENCOUNTER SYMPTOMS
DOUBLE VISION: 0
ABDOMINAL PAIN: 0
STRIDOR: 0
HEMOPTYSIS: 0
EYE REDNESS: 0
HEMATOCHEZIA: 0
HOARSE VOICE: 0
WHEEZING: 0
HEMATEMESIS: 0

## 2025-05-27 NOTE — PROGRESS NOTES
[facility] CARDIOLOGY  [address] [place] [address], [address]  [place] [address]  PHONE: [phone]          25    NAME:  José Velazquez Jr.  : 1950  MRN: [id number]         SUBJECTIVE:   José Velazquez Jr. is a 74 y.o. male seen for a visit regarding the following:     Chief Complaint   Patient presents with    Hyperlipidemia             HPI:    Cardio problem list:  1.  Irregular heartbeat/PACs  -3-day Holter monitor from 2023 showed sinus rhythm with an average heart rate of 71 bpm-frequent PACs-12% of all beats  2.  Hyperlipidemia  3.  Thrombocytopenia  4.  Chest pain:  -Coronary angiography on 2023 showed normal coronaries, normal EDP.  -Stress echo from 3/31/2023-abnormal with severe anteroseptal wall ischemia, baseline EF 60 to 65%, achieved 10.1 METS  -Had a CT coronary calcium score in 2018 that came back at 0  5. BPH      I saw Mr. Velazquez who is a pleasant 74-year-old gentleman in cardiovascular follow-up for an irregular heartbeat with PACs and chest pain with known underlying hyperlipidemia and an abnormal stress echo from 3/31/2023 with inducible anteroseptal ischemia along with thrombocytopenia.    When we last met with him, we made no significant changes with his medical therapy and continued his metoprolol succinate along with rosuvastatin.      Chest pain- much overall much better since he last met with us.  No significant chest pain at all at this point.  He may get a little tightness when he overexerts up a hill but not otherwise.    Irregular heartbeat: From his frequent PACs-12% of all beats noted on Holter monitor.  Much better controlled on metoprolol succinate now.  Much less frequent but still present on exam.    Thrombocytopenia-being worked up by hematology currently.  Been through a bone marrow biopsy with flow cytometry-fortunately much better at this point.    [id number] well at this point.  Had BPH and is treated for    Hyperlipidemia-remains

## 2025-07-28 DIAGNOSIS — D69.6 THROMBOCYTOPENIA: ICD-10-CM

## 2025-07-28 LAB
ALBUMIN SERPL-MCNC: 3.7 G/DL (ref 3.2–4.6)
ALBUMIN/GLOB SERPL: 1.3 (ref 1–1.9)
ALP SERPL-CCNC: 70 U/L (ref 40–129)
ALT SERPL-CCNC: 38 U/L (ref 8–55)
ANION GAP SERPL CALC-SCNC: 12 MMOL/L (ref 7–16)
AST SERPL-CCNC: 21 U/L (ref 15–37)
BASOPHILS # BLD: 0.06 K/UL (ref 0–0.2)
BASOPHILS NFR BLD: 0.7 % (ref 0–2)
BILIRUB SERPL-MCNC: 0.6 MG/DL (ref 0–1.2)
BUN SERPL-MCNC: 21 MG/DL (ref 8–23)
CALCIUM SERPL-MCNC: 10.5 MG/DL (ref 8.8–10.2)
CHLORIDE SERPL-SCNC: 107 MMOL/L (ref 98–107)
CO2 SERPL-SCNC: 24 MMOL/L (ref 20–29)
CREAT SERPL-MCNC: 1.3 MG/DL (ref 0.8–1.3)
DIFFERENTIAL METHOD BLD: ABNORMAL
EOSINOPHIL # BLD: 0.11 K/UL (ref 0–0.8)
EOSINOPHIL NFR BLD: 1.2 % (ref 0.5–7.8)
ERYTHROCYTE [DISTWIDTH] IN BLOOD BY AUTOMATED COUNT: 13.4 % (ref 11.9–14.6)
GLOBULIN SER CALC-MCNC: 2.9 G/DL (ref 2.3–3.5)
GLUCOSE SERPL-MCNC: 108 MG/DL (ref 70–99)
HCT VFR BLD AUTO: 45.4 % (ref 41.1–50.3)
HGB BLD-MCNC: 15 G/DL (ref 13.6–17.2)
IMM GRANULOCYTES # BLD AUTO: 0.07 K/UL (ref 0–0.5)
IMM GRANULOCYTES NFR BLD AUTO: 0.8 % (ref 0–5)
LYMPHOCYTES # BLD: 1.53 K/UL (ref 0.5–4.6)
LYMPHOCYTES NFR BLD: 16.6 % (ref 13–44)
MCH RBC QN AUTO: 28.1 PG (ref 26.1–32.9)
MCHC RBC AUTO-ENTMCNC: 33 G/DL (ref 31.4–35)
MCV RBC AUTO: 85 FL (ref 82–102)
MONOCYTES # BLD: 0.76 K/UL (ref 0.1–1.3)
MONOCYTES NFR BLD: 8.2 % (ref 4–12)
NEUTS SEG # BLD: 6.7 K/UL (ref 1.7–8.2)
NEUTS SEG NFR BLD: 72.5 % (ref 43–78)
NRBC # BLD: 0 K/UL (ref 0–0.2)
PLATELET # BLD AUTO: 114 K/UL (ref 150–450)
PMV BLD AUTO: 10.3 FL (ref 9.4–12.3)
POTASSIUM SERPL-SCNC: 4 MMOL/L (ref 3.5–5.1)
PROT SERPL-MCNC: 6.6 G/DL (ref 6.3–8.2)
RBC # BLD AUTO: 5.34 M/UL (ref 4.23–5.6)
SODIUM SERPL-SCNC: 142 MMOL/L (ref 136–145)
WBC # BLD AUTO: 9.2 K/UL (ref 4.3–11.1)

## 2025-07-31 ENCOUNTER — OFFICE VISIT (OUTPATIENT)
Dept: ONCOLOGY | Age: 75
End: 2025-07-31
Payer: MEDICARE

## 2025-07-31 VITALS
HEART RATE: 60 BPM | DIASTOLIC BLOOD PRESSURE: 72 MMHG | OXYGEN SATURATION: 97 % | HEIGHT: 72 IN | WEIGHT: 242 LBS | RESPIRATION RATE: 20 BRPM | BODY MASS INDEX: 32.78 KG/M2 | SYSTOLIC BLOOD PRESSURE: 135 MMHG | TEMPERATURE: 97.9 F

## 2025-07-31 DIAGNOSIS — D69.6 THROMBOCYTOPENIA: ICD-10-CM

## 2025-07-31 DIAGNOSIS — D69.3 CHRONIC ITP (IDIOPATHIC THROMBOCYTOPENIA) (HCC): Primary | ICD-10-CM

## 2025-07-31 PROCEDURE — 3017F COLORECTAL CA SCREEN DOC REV: CPT | Performed by: NURSE PRACTITIONER

## 2025-07-31 PROCEDURE — 1159F MED LIST DOCD IN RCRD: CPT | Performed by: NURSE PRACTITIONER

## 2025-07-31 PROCEDURE — 1160F RVW MEDS BY RX/DR IN RCRD: CPT | Performed by: NURSE PRACTITIONER

## 2025-07-31 PROCEDURE — 99214 OFFICE O/P EST MOD 30 MIN: CPT | Performed by: NURSE PRACTITIONER

## 2025-07-31 PROCEDURE — 1126F AMNT PAIN NOTED NONE PRSNT: CPT | Performed by: NURSE PRACTITIONER

## 2025-07-31 PROCEDURE — 1123F ACP DISCUSS/DSCN MKR DOCD: CPT | Performed by: NURSE PRACTITIONER

## 2025-07-31 PROCEDURE — G8417 CALC BMI ABV UP PARAM F/U: HCPCS | Performed by: NURSE PRACTITIONER

## 2025-07-31 PROCEDURE — 1036F TOBACCO NON-USER: CPT | Performed by: NURSE PRACTITIONER

## 2025-07-31 PROCEDURE — G8427 DOCREV CUR MEDS BY ELIG CLIN: HCPCS | Performed by: NURSE PRACTITIONER

## 2025-07-31 ASSESSMENT — PATIENT HEALTH QUESTIONNAIRE - PHQ9
SUM OF ALL RESPONSES TO PHQ QUESTIONS 1-9: 0
2. FEELING DOWN, DEPRESSED OR HOPELESS: NOT AT ALL
SUM OF ALL RESPONSES TO PHQ QUESTIONS 1-9: 0
1. LITTLE INTEREST OR PLEASURE IN DOING THINGS: NOT AT ALL

## 2025-07-31 NOTE — PATIENT INSTRUCTIONS
No visits with results within 1 Day(s) from this visit.   Latest known visit with results is:   Orders Only on 07/28/2025   Component Date Value Ref Range Status    Sodium 07/28/2025 142  136 - 145 mmol/L Final    Potassium 07/28/2025 4.0  3.5 - 5.1 mmol/L Final    Chloride 07/28/2025 107  98 - 107 mmol/L Final    CO2 07/28/2025 24  20 - 29 mmol/L Final    Anion Gap 07/28/2025 12  7 - 16 mmol/L Final    Glucose 07/28/2025 108 (H)  70 - 99 mg/dL Final    Comment: <70 mg/dL Consistent with, but not fully diagnostic of hypoglycemia.  100 - 125 mg/dL Impaired fasting glucose/consistent with pre-diabetes mellitus.  > 126 mg/dl Fasting glucose consistent with overt diabetes mellitus      BUN 07/28/2025 21  8 - 23 MG/DL Final    Creatinine 07/28/2025 1.30  0.80 - 1.30 MG/DL Final    Est, Glom Filt Rate 07/28/2025 58 (L)  >60 ml/min/1.73m2 Final    Comment:   Pediatric calculator link: https://www.kidney.org/professionals/kdoqi/gfr_calculatorped    These results are not intended for use in patients <18 years of age.    eGFR results are calculated without a race factor using  the 2021 CKD-EPI equation. Careful clinical correlation is recommended, particularly when comparing to results calculated using previous equations.  The CKD-EPI equation is less accurate in patients with extremes of muscle mass, extra-renal metabolism of creatinine, excessive creatine ingestion, or following therapy that affects renal tubular secretion.      Calcium 07/28/2025 10.5 (H)  8.8 - 10.2 MG/DL Final    Total Bilirubin 07/28/2025 0.6  0.0 - 1.2 MG/DL Final    ALT 07/28/2025 38  8 - 55 U/L Final    AST 07/28/2025 21  15 - 37 U/L Final    Alk Phosphatase 07/28/2025 70  40 - 129 U/L Final    Total Protein 07/28/2025 6.6  6.3 - 8.2 g/dL Final    Albumin 07/28/2025 3.7  3.2 - 4.6 g/dL Final    Globulin 07/28/2025 2.9  2.3 - 3.5 g/dL Final    Albumin/Globulin Ratio 07/28/2025 1.3  1.0 - 1.9   Final    WBC 07/28/2025 9.2  4.3 - 11.1 K/uL Final    RBC

## 2025-07-31 NOTE — PROGRESS NOTES
[facility] Hematology and Oncology: Office Visit Established Patient    Reason for follow up:    Thrombocytopenia    Overview: (copied from prior)  Mr. Velazquez was seen in the office initially on 2/7/2023 for evaluation of thrombocytopenia (Platelets 122 and 112 in 10/22 and 1/23 respectively) noticed on recent labs ordered by his PCP.  In last few months, he has noticed a tendency to bruise easily with subjectively minor trauma.  He denies hematemesis, bloody/black stools, nosebleeds/gum bleeds, unexplained fever, drenching night sweats, swollen glands in the body, new/unusual sites of bone pain, chest pain or palpitations.  He was evaluated by cardiology about 2 years ago for anginal symptoms and reportedly work-up for coronary artery disease was negative.  He has had episodes of hematuria in the past which were deemed related to BPH.  He follows with urology at [facility], last visit was in October 2022.  He underwent stress echocardiogram on 3/31/23 which showed severe anteroseptal ischemia. Left heart cath is being planned.     Interval history:    7/31/2025 Here today for follow up regarding his history of thrombocytopenia, presumably related to immune thrombocytopenic purpura (ITP), as previous workup including a bone marrow biopsy remains unremarkable. He continues to do well overall. He has no new issues or complaints to report. He has fair energy, good appetite. He has no significant bleeding or bruising - he does on occasion have bleeding hemorrhoids and plans to see GI again. He has no lymphadenopathy. No weight loss, fevers. Labs reviewed and stable. Platelets are 114,000, Hgb 15.0, WBC 9.2. we will continue simple monitoring of his CBC. He knows symptoms to report regarding low platelets. He will follow up in 6 months or sooner if needed.       Review of Systems:  14 point ROS was negative except as per HPI      ECOG PERFORMANCE STATUS - 0-Fully active, able to carry on all pre-disease performance

## (undated) DEVICE — BAND COMPR L24CM REG CLR PLAS HEMSTAT EXT HK AND LOOP RETEN

## (undated) DEVICE — GLIDESHEATH SLENDER STAINLESS STEEL KIT: Brand: GLIDESHEATH SLENDER

## (undated) DEVICE — GUIDEWIRE VASC L260CM DIA0.035IN RAD 3MM J TIP L7CM PTFE

## (undated) DEVICE — CATHETER COR DIAG JUDKINS R 5.0 CRV 6FR 100CM 0 SIDE H

## (undated) DEVICE — CATHETER COR DIAG JUDKINS L 3.5 CRV 6FR 100CM 0 SIDE H

## (undated) DEVICE — CATHETER DIAG 6FR L110CM PIGTAILS CRV STYL PIG145 DXTERITY